# Patient Record
Sex: FEMALE | Race: ASIAN | NOT HISPANIC OR LATINO | URBAN - METROPOLITAN AREA
[De-identification: names, ages, dates, MRNs, and addresses within clinical notes are randomized per-mention and may not be internally consistent; named-entity substitution may affect disease eponyms.]

---

## 2018-05-20 ENCOUNTER — EMERGENCY (EMERGENCY)
Facility: HOSPITAL | Age: 38
LOS: 1 days | Discharge: HOME | End: 2018-05-20
Attending: EMERGENCY MEDICINE | Admitting: EMERGENCY MEDICINE
Payer: MEDICAID

## 2018-05-20 VITALS
OXYGEN SATURATION: 100 % | HEART RATE: 94 BPM | SYSTOLIC BLOOD PRESSURE: 139 MMHG | DIASTOLIC BLOOD PRESSURE: 101 MMHG | TEMPERATURE: 98 F

## 2018-05-20 PROCEDURE — 99283 EMERGENCY DEPT VISIT LOW MDM: CPT

## 2018-05-20 RX ORDER — FLUCONAZOLE 150 MG/1
150 TABLET ORAL ONCE
Qty: 0 | Refills: 0 | Status: COMPLETED | OUTPATIENT
Start: 2018-05-20 | End: 2018-05-20

## 2018-05-20 RX ORDER — LIDOCAINE 4 G/100G
5 CREAM TOPICAL
Qty: 2 | Refills: 0 | OUTPATIENT
Start: 2018-05-20

## 2018-05-20 RX ORDER — LIDOCAINE 4 G/100G
1 CREAM TOPICAL ONCE
Qty: 0 | Refills: 0 | Status: DISCONTINUED | OUTPATIENT
Start: 2018-05-20 | End: 2018-05-20

## 2018-05-20 RX ORDER — LIDOCAINE 4 G/100G
1 CREAM TOPICAL ONCE
Qty: 0 | Refills: 0 | Status: COMPLETED | OUTPATIENT
Start: 2018-05-20 | End: 2018-05-20

## 2018-05-20 RX ADMIN — FLUCONAZOLE 150 MILLIGRAM(S): 150 TABLET ORAL at 14:18

## 2018-05-20 RX ADMIN — LIDOCAINE 1 APPLICATION(S): 4 CREAM TOPICAL at 14:19

## 2018-05-20 NOTE — ED PROVIDER NOTE - MEDICAL DECISION MAKING DETAILS
38yof w/ vaginal itching and burning, scant white discharge c/w candidal vulvovaginitis likely secondary to recent antibiotic use. Will give single dose oral fluconazole, topical lidocaine. Pt has follow up arranged w/ GYN in 3 days. No concerning systemic signs or symptoms, exam not c/w PID, no urinary symptoms.

## 2018-05-20 NOTE — ED PROVIDER NOTE - ATTENDING CONTRIBUTION TO CARE
DR. PAUL, ATTENDING MD-  I performed a face to face bedside interview with patient regarding history of present illness, review of symptoms and past medical history. I completed an independent physical exam.  I have discussed patient's plan of care with the resident.   Documentation as above in the note.    39 y/o female with vaginal itching x2 wks, recent abx use for uti.  Denies f/c, ha, neck stiffness, cp, sob, cough, abd pain, n/v/d, dysuria.  Afebrile, vs wnl, nad, ctabil, s1s2 rrr no m/r/g, abd soft non ttp no r/g, gu:  (chaperone RN Vanessa) scaling rash to b/l labia and surrounding region without discharge or fluctuance.  Vulvovaginitis.  Monostat, gyn f/u.

## 2018-05-20 NOTE — ED PROVIDER NOTE - OBJECTIVE STATEMENT
38yof w/ DM2 and HLD p/w vaginal itching and burning. Setswana speaking,  [id number] used via telephone. Pt had gradual onset vaginal itching, burning and swelling two weeks ago, associated w/ dysuria and suprapubic pain. Was seen at [facility] and was treated w/ an antibiotic. Urinary symptoms and pain have resolved, but still having significant vaginal itching and pain w/ ambulation. Denies any vaginal discharge, fevers, chills, abdominal pain. No hx of pelvic infections. LMP 5/10/18.

## 2018-05-20 NOTE — ED ADULT TRIAGE NOTE - CHIEF COMPLAINT QUOTE
# [id number]  Pt arrives via EMS.  Presents for of perineal itchiness, denies discharge, and burning when urinating.  Pt evaluated for same at [facility] on 5/17/18, Dx with UTI and cystitis.  Started on Macrobid and Pyridium.

## 2018-11-28 ENCOUNTER — INPATIENT (INPATIENT)
Facility: HOSPITAL | Age: 38
LOS: 2 days | Discharge: HOME HEALTH | End: 2018-12-01
Attending: INTERNAL MEDICINE | Admitting: INTERNAL MEDICINE
Payer: COMMERCIAL

## 2018-11-28 VITALS
OXYGEN SATURATION: 99 % | RESPIRATION RATE: 18 BRPM | HEART RATE: 92 BPM | DIASTOLIC BLOOD PRESSURE: 95 MMHG | TEMPERATURE: 98 F | SYSTOLIC BLOOD PRESSURE: 136 MMHG

## 2018-11-28 DIAGNOSIS — S02.609A FRACTURE OF MANDIBLE, UNSPECIFIED, INITIAL ENCOUNTER FOR CLOSED FRACTURE: ICD-10-CM

## 2018-11-28 DIAGNOSIS — E11.9 TYPE 2 DIABETES MELLITUS WITHOUT COMPLICATIONS: ICD-10-CM

## 2018-11-28 LAB
ALBUMIN SERPL ELPH-MCNC: 4.7 G/DL — SIGNIFICANT CHANGE UP (ref 3.3–5)
ALP SERPL-CCNC: 66 U/L — SIGNIFICANT CHANGE UP (ref 40–120)
ALT FLD-CCNC: 48 U/L — HIGH (ref 4–33)
AST SERPL-CCNC: 33 U/L — HIGH (ref 4–32)
BASOPHILS # BLD AUTO: 0.04 K/UL — SIGNIFICANT CHANGE UP (ref 0–0.2)
BASOPHILS NFR BLD AUTO: 0.5 % — SIGNIFICANT CHANGE UP (ref 0–2)
BILIRUB SERPL-MCNC: 0.2 MG/DL — SIGNIFICANT CHANGE UP (ref 0.2–1.2)
BUN SERPL-MCNC: 4 MG/DL — LOW (ref 7–23)
CALCIUM SERPL-MCNC: 9.5 MG/DL — SIGNIFICANT CHANGE UP (ref 8.4–10.5)
CHLORIDE SERPL-SCNC: 103 MMOL/L — SIGNIFICANT CHANGE UP (ref 98–107)
CO2 SERPL-SCNC: 20 MMOL/L — LOW (ref 22–31)
CREAT SERPL-MCNC: 0.61 MG/DL — SIGNIFICANT CHANGE UP (ref 0.5–1.3)
EOSINOPHIL # BLD AUTO: 0.1 K/UL — SIGNIFICANT CHANGE UP (ref 0–0.5)
EOSINOPHIL NFR BLD AUTO: 1.2 % — SIGNIFICANT CHANGE UP (ref 0–6)
GLUCOSE SERPL-MCNC: 127 MG/DL — HIGH (ref 70–99)
HCT VFR BLD CALC: 33.9 % — LOW (ref 34.5–45)
HGB BLD-MCNC: 10.7 G/DL — LOW (ref 11.5–15.5)
IMM GRANULOCYTES # BLD AUTO: 0.02 # — SIGNIFICANT CHANGE UP
IMM GRANULOCYTES NFR BLD AUTO: 0.2 % — SIGNIFICANT CHANGE UP (ref 0–1.5)
LYMPHOCYTES # BLD AUTO: 2.66 K/UL — SIGNIFICANT CHANGE UP (ref 1–3.3)
LYMPHOCYTES # BLD AUTO: 31.8 % — SIGNIFICANT CHANGE UP (ref 13–44)
MCHC RBC-ENTMCNC: 25.8 PG — LOW (ref 27–34)
MCHC RBC-ENTMCNC: 31.6 % — LOW (ref 32–36)
MCV RBC AUTO: 81.7 FL — SIGNIFICANT CHANGE UP (ref 80–100)
MONOCYTES # BLD AUTO: 0.44 K/UL — SIGNIFICANT CHANGE UP (ref 0–0.9)
MONOCYTES NFR BLD AUTO: 5.3 % — SIGNIFICANT CHANGE UP (ref 2–14)
NEUTROPHILS # BLD AUTO: 5.11 K/UL — SIGNIFICANT CHANGE UP (ref 1.8–7.4)
NEUTROPHILS NFR BLD AUTO: 61 % — SIGNIFICANT CHANGE UP (ref 43–77)
NRBC # FLD: 0 — SIGNIFICANT CHANGE UP
PLATELET # BLD AUTO: 449 K/UL — HIGH (ref 150–400)
PMV BLD: 9.4 FL — SIGNIFICANT CHANGE UP (ref 7–13)
POTASSIUM SERPL-MCNC: 3.9 MMOL/L — SIGNIFICANT CHANGE UP (ref 3.5–5.3)
POTASSIUM SERPL-SCNC: 3.9 MMOL/L — SIGNIFICANT CHANGE UP (ref 3.5–5.3)
PROT SERPL-MCNC: 8.3 G/DL — SIGNIFICANT CHANGE UP (ref 6–8.3)
RBC # BLD: 4.15 M/UL — SIGNIFICANT CHANGE UP (ref 3.8–5.2)
RBC # FLD: 13.2 % — SIGNIFICANT CHANGE UP (ref 10.3–14.5)
SODIUM SERPL-SCNC: 136 MMOL/L — SIGNIFICANT CHANGE UP (ref 135–145)
WBC # BLD: 8.37 K/UL — SIGNIFICANT CHANGE UP (ref 3.8–10.5)
WBC # FLD AUTO: 8.37 K/UL — SIGNIFICANT CHANGE UP (ref 3.8–10.5)

## 2018-11-28 PROCEDURE — 70487 CT MAXILLOFACIAL W/DYE: CPT | Mod: 26

## 2018-11-28 PROCEDURE — 99223 1ST HOSP IP/OBS HIGH 75: CPT

## 2018-11-28 RX ORDER — OXYCODONE HYDROCHLORIDE 5 MG/1
5 TABLET ORAL EVERY 6 HOURS
Qty: 0 | Refills: 0 | Status: DISCONTINUED | OUTPATIENT
Start: 2018-11-28 | End: 2018-11-28

## 2018-11-28 RX ORDER — SODIUM CHLORIDE 9 MG/ML
1000 INJECTION INTRAMUSCULAR; INTRAVENOUS; SUBCUTANEOUS
Qty: 0 | Refills: 0 | Status: DISCONTINUED | OUTPATIENT
Start: 2018-11-28 | End: 2018-12-01

## 2018-11-28 RX ORDER — MORPHINE SULFATE 50 MG/1
2 CAPSULE, EXTENDED RELEASE ORAL EVERY 4 HOURS
Qty: 0 | Refills: 0 | Status: DISCONTINUED | OUTPATIENT
Start: 2018-11-28 | End: 2018-11-29

## 2018-11-28 RX ORDER — ACETAMINOPHEN 500 MG
650 TABLET ORAL EVERY 6 HOURS
Qty: 0 | Refills: 0 | Status: DISCONTINUED | OUTPATIENT
Start: 2018-11-28 | End: 2018-11-30

## 2018-11-28 RX ORDER — AMPICILLIN SODIUM AND SULBACTAM SODIUM 250; 125 MG/ML; MG/ML
3 INJECTION, POWDER, FOR SUSPENSION INTRAMUSCULAR; INTRAVENOUS EVERY 6 HOURS
Qty: 0 | Refills: 0 | Status: COMPLETED | OUTPATIENT
Start: 2018-11-28 | End: 2018-11-30

## 2018-11-28 RX ORDER — ACETAMINOPHEN 500 MG
975 TABLET ORAL ONCE
Qty: 0 | Refills: 0 | Status: COMPLETED | OUTPATIENT
Start: 2018-11-28 | End: 2018-11-28

## 2018-11-28 RX ORDER — OXYCODONE HYDROCHLORIDE 5 MG/1
5 TABLET ORAL EVERY 6 HOURS
Qty: 0 | Refills: 0 | Status: DISCONTINUED | OUTPATIENT
Start: 2018-11-28 | End: 2018-11-29

## 2018-11-28 RX ADMIN — AMPICILLIN SODIUM AND SULBACTAM SODIUM 200 GRAM(S): 250; 125 INJECTION, POWDER, FOR SUSPENSION INTRAMUSCULAR; INTRAVENOUS at 19:00

## 2018-11-28 RX ADMIN — Medication 975 MILLIGRAM(S): at 13:38

## 2018-11-28 NOTE — ED PROVIDER NOTE - ATTENDING CONTRIBUTION TO CARE
I, Ramesh Mayers MD, personally saw the patient with ACP.  I have personally performed a face to face diagnostic evaluation on this patient.  I have reviewed the ACP note and agree with the history, exam, and plan of care, except as noted.

## 2018-11-28 NOTE — CONSULT NOTE ADULT - SUBJECTIVE AND OBJECTIVE BOX
38y F presents with R facial swelling. Pt speaks Croatian and used  (#[id number]). Pt reports that tooth #32 was extracted 10 days ago at [facility] due to pain and infection. She states that remarkable pain and swelling began several days ago at which point she returned to [place]. She was prescribed Ibuprofen and Clindamycin. Patient states that pain and swelling have not subsided and is increasing. Does endorse subjective fevers and diarrhea. No cough, abd pain, or vomiting.      PMH: Diabetes, GERD,   PSH: Biopsy/exam 2 weeks ago due to anemia/menorrhagia of unknown origin  Meds: Metformin, Rinatidine, Clindamycin, Ibuprofen, Multivitamins  SH: denies  All: NKDA    Vital Signs Last 24 Hrs  T(C): 36.6 (28 Nov 2018 17:52), Max: 36.9 (28 Nov 2018 12:43)  T(F): 97.9 (28 Nov 2018 17:52), Max: 98.4 (28 Nov 2018 12:43)  HR: 93 (28 Nov 2018 17:52) (92 - 93)  BP: 129/85 (28 Nov 2018 17:52) (129/85 - 136/95)  RR: 18 (28 Nov 2018 17:52) (18 - 18)  SpO2: 100% (28 Nov 2018 17:52) (99% - 100%)    Gen: NAD, AAOx3  EOE: R facial edema, palpable bony protrusion appreciated R mandible  MICHELLE: 20mm  IOE: generalized good OH, exo socket #32 visualized, occlusion stable and reproducible, gingiva clean, no signs of infection                          10.7   8.37  )-----------( 449      ( 28 Nov 2018 13:40 )             33.9     11-28    136  |  103  |  4<L>  ----------------------------<  127<H>  3.9   |  20<L>  |  0.61    Ca    9.5      28 Nov 2018 13:40    TPro  8.3  /  Alb  4.7  /  TBili  0.2  /  DBili  x   /  AST  33<H>  /  ALT  48<H>  /  AlkPhos  66  11-28    I&O's Summary    Radiographic findings: (CT Max/Face without contrast) Recent extraction socket for the right third mandibular molar tooth   associated with a displaced fracture through the mandibular angle with overlapping of fracture fragments, widening of the fracture line and bordering soft tissue swelling worrisome for an infected fracture. No soft tissue abscess.     A/P: 38y Female w/ worsening R facial swelling and R mandible fracture 3 days s/p treatment w/ Clindamycin 10 days s/p third molar extraction.    - Recommend medicine admission due to continued swelling in the setting of tx with clindamycin as well as recent history of anemia/menorrhagia of unknown origin.   - In the setting of mandible fracture, in case condition worsens and warrants surgical intervention, will need pre-surgical labs.   - Recommend soft non-chew diet  - NPO midnight in the event that patient will need surgical intervention 38y F presents with R facial swelling. Pt speaks Irish and used  (#[id number]). Pt reports that tooth #32 was extracted 10 days ago at [facility] due to pain and infection. She states that remarkable pain and swelling began several days ago at which point she returned to [place]. She was prescribed Ibuprofen and Clindamycin. Patient states that pain and swelling have not subsided and is increasing. Does endorse subjective fevers and diarrhea. No cough, abd pain, or vomiting.      PMH: Diabetes, GERD,   PSH: Biopsy/exam 2 weeks ago due to anemia/menorrhagia of unknown origin  Meds: Metformin, Rinatidine, Clindamycin, Ibuprofen, Multivitamins  SH: denies  All: NKDA    Vital Signs Last 24 Hrs  T(C): 36.6 (28 Nov 2018 17:52), Max: 36.9 (28 Nov 2018 12:43)  T(F): 97.9 (28 Nov 2018 17:52), Max: 98.4 (28 Nov 2018 12:43)  HR: 93 (28 Nov 2018 17:52) (92 - 93)  BP: 129/85 (28 Nov 2018 17:52) (129/85 - 136/95)  RR: 18 (28 Nov 2018 17:52) (18 - 18)  SpO2: 100% (28 Nov 2018 17:52) (99% - 100%)    Gen: NAD, AAOx3  EOE: R facial edema, palpable bony protrusion appreciated R mandible  MICHELLE: 20mm  IOE: generalized good OH, exo socket #32 visualized, occlusion stable and reproducible, gingiva clean, no signs of infection                          10.7   8.37  )-----------( 449      ( 28 Nov 2018 13:40 )             33.9     11-28    136  |  103  |  4<L>  ----------------------------<  127<H>  3.9   |  20<L>  |  0.61    Ca    9.5      28 Nov 2018 13:40    TPro  8.3  /  Alb  4.7  /  TBili  0.2  /  DBili  x   /  AST  33<H>  /  ALT  48<H>  /  AlkPhos  66  11-28    I&O's Summary    Radiographic findings: (CT Max/Face without contrast) Recent extraction socket for the right third mandibular molar tooth   associated with a displaced fracture through the mandibular angle with overlapping of fracture fragments, widening of the fracture line and bordering soft tissue swelling worrisome for an infected fracture. No soft tissue abscess.     A/P: 38y Female w/ worsening R facial swelling and R mandible fracture 3 days s/p treatment w/ Clindamycin 10 days s/p third molar extraction.    - Recommend medicine admission due to continued swelling in the setting of tx with clindamycin as well as recent history of anemia/menorrhagia of unknown origin.   - In the setting of mandible fracture, in case condition worsens and warrants surgical intervention, will need pre-surgical labs.   - Recommend soft non-chew diet  - Peridex BID  - NPO midnight in the event that patient will need surgical intervention 38y F presents with R facial swelling. Pt speaks German and used  (#[id number]). Pt reports that tooth #32 was extracted 10 days ago at [facility] due to pain and infection. She states that remarkable pain and swelling began several days ago at which point she returned to [place]. She was prescribed Ibuprofen and Clindamycin. Patient states that pain and swelling have not subsided and is increasing. Does endorse subjective fevers and diarrhea. No cough, abd pain, or vomiting.      PMH: Diabetes, GERD,   PSH: Biopsy/exam 2 weeks ago due to anemia/menorrhagia of unknown origin  Meds: Metformin, Rinatidine, Clindamycin, Ibuprofen, Multivitamins  SH: denies  All: NKDA    Vital Signs Last 24 Hrs  T(C): 36.6 (28 Nov 2018 17:52), Max: 36.9 (28 Nov 2018 12:43)  T(F): 97.9 (28 Nov 2018 17:52), Max: 98.4 (28 Nov 2018 12:43)  HR: 93 (28 Nov 2018 17:52) (92 - 93)  BP: 129/85 (28 Nov 2018 17:52) (129/85 - 136/95)  RR: 18 (28 Nov 2018 17:52) (18 - 18)  SpO2: 100% (28 Nov 2018 17:52) (99% - 100%)    Gen: NAD, AAOx3  EOE: R facial edema, palpable bony protrusion appreciated R mandible  MICHELLE: 20mm  IOE: generalized good OH, exo socket #32 visualized, occlusion stable and reproducible, gingiva clean, no signs of infection                          10.7   8.37  )-----------( 449      ( 28 Nov 2018 13:40 )             33.9     11-28    136  |  103  |  4<L>  ----------------------------<  127<H>  3.9   |  20<L>  |  0.61    Ca    9.5      28 Nov 2018 13:40    TPro  8.3  /  Alb  4.7  /  TBili  0.2  /  DBili  x   /  AST  33<H>  /  ALT  48<H>  /  AlkPhos  66  11-28    I&O's Summary    Radiographic findings: (CT Max/Face without contrast) Recent extraction socket for the right third mandibular molar tooth   associated with a displaced fracture through the mandibular angle with overlapping of fracture fragments, widening of the fracture line and bordering soft tissue swelling worrisome for an infected fracture. No soft tissue abscess.     A/P: 38y Female w/ worsening R facial swelling and R mandible fracture 3 days s/p treatment w/ Clindamycin 10 days s/p third molar extraction.    - Recommend medicine admission due to continued swelling in the setting of tx with clindamycin as well as recent history of anemia/menorrhagia of unknown origin.   - In the setting of mandible fracture, in case condition worsens and warrants surgical intervention, will need pre-surgical labs.   - Recommend soft non-chew diet  - Peridex BID  - NPO midnight in the event that patient will need surgical intervention

## 2018-11-28 NOTE — ED PROVIDER NOTE - MEDICAL DECISION MAKING DETAILS
pt s/p wisdom tooth extraction now w/ subjective fever and worsening pain/swelling - CT to eval for abscess/infectious process

## 2018-11-28 NOTE — ED PROVIDER NOTE - PROGRESS NOTE DETAILS
SARAH Chavez - pt admitted to [facility] (dr Cedeno spoke with admitting physician), pt seen by [facility], they advise IV Unasyn and will f/u on pt. MAR text paged.

## 2018-11-28 NOTE — ED PROVIDER NOTE - CARE PLAN
Principal Discharge DX:	Mandibular fracture Principal Discharge DX:	Mandibular fracture  Secondary Diagnosis:	Cellulitis

## 2018-11-28 NOTE — H&P ADULT - NSHPREVIEWOFSYSTEMS_GEN_ALL_CORE
REVIEW OF SYSTEMS:    CONSTITUTIONAL: No weakness, + fevers and chills, no weight loss  EYES/ENT: + R jaw pain, No visual changes;  No dysphagia or odynophagia, no tinnitus  NECK: No pain or stiffness  RESPIRATORY: No cough, wheezing, hemoptysis; No shortness of breath  CARDIOVASCULAR: No chest pain or palpitations; No lower extremity edema  GASTROINTESTINAL: No abdominal or epigastric pain. No nausea, vomiting, or hematemesis; No diarrhea or constipation. No melena or hematochezia.  MUSCULOSKELETAL: No joint pain, swelling, erythema or warmth, no back pain  GENITOURINARY: No dysuria, frequency or hematuria, no suprapubic pain  NEUROLOGICAL: No numbness or weakness, no headache, no syncope, no gait abnormalities   SKIN: No itching, burning, rashes, or lesions   All other review of systems is negative unless indicated above.

## 2018-11-28 NOTE — ED PROVIDER NOTE - OBJECTIVE STATEMENT
38y F presents to the ED with dental pain x1 week. Pt speaks Japanese and used  ([id number]). Pt states she went to dentist last week and had 1 wisdom tooth extraction. Since then having increasing pain. Does endorse subjective fevers and diarrhea. No cough, abd pain, or vomiting. States she cannot open mouth properly 38y F presents to the ED with dental pain x1 week. Pt speaks Yoruba and used  ([id number]). Pt states she went to dentist last week and had 1 wisdom tooth extraction R mandibular. Since then having increasing pain. Does endorse subjective fevers and diarrhea. No cough, abd pain, or vomiting. States she cannot open mouth properly.  Evaluated by oral surgeon, given ibuprofen w/ limited relief.

## 2018-11-28 NOTE — ED PROVIDER NOTE - NS_ ATTENDINGSCRIBEDETAILS _ED_A_ED_FT
The scribe's documentation has been prepared under my direction and personally reviewed by me in its entirety. I confirm that the note above accurately reflects all work, treatment, procedures, and medical decision-making performed by me.  Ramesh Mayers MD

## 2018-11-28 NOTE — H&P ADULT - FAMILY HISTORY
Father  Still living? Unknown  Family history of diabetes mellitus, Age at diagnosis: Age Unknown  Family history of hypertension, Age at diagnosis: Age Unknown

## 2018-11-28 NOTE — H&P ADULT - PROBLEM SELECTOR PLAN 1
- Pt reported frequent fevers at home- will continue unasyn for now  - Will follow up with [facility] regarding need for surgery  - IV morphine prn for pain control

## 2018-11-28 NOTE — H&P ADULT - NSHPPHYSICALEXAM_GEN_ALL_CORE
T(C): 36.6 (11-28-18 @ 17:52), Max: 36.9 (11-28-18 @ 12:43)  HR: 93 (11-28-18 @ 17:52) (92 - 93)  BP: 129/85 (11-28-18 @ 17:52) (129/85 - 136/95)  RR: 18 (11-28-18 @ 17:52) (18 - 18)  SpO2: 100% (11-28-18 @ 17:52) (99% - 100%)    GENERAL: No acute distress, well-developed  HEAD:  Atraumatic, Normocephalic  ENT: EOMI, PERRLA, conjunctiva and sclera clear, Neck supple, No JVD, moist mucosa, no pharyngeal erythema, no tonsillar enlargement or exudate  CHEST/LUNG: Clear to auscultation bilaterally; No wheeze, equal breath sounds bilaterally   HEART: Regular rate and rhythm; No murmurs, rubs, or gallops  ABDOMEN: Soft, Nontender, Nondistended; Bowel sounds present, no organomegaly  EXTREMITIES:  2+ Peripheral Pulses, No clubbing, cyanosis, or edema  PSYCH: AAOx3, normal affect, normal behavior  NEUROLOGY: non-focal, cranial nerves intact, 5/5 strength throughout, normal sensation   SKIN: Normal color, No rashes or lesions

## 2018-11-28 NOTE — H&P ADULT - NSHPLABSRESULTS_GEN_ALL_CORE
.  LABS:                         10.7   8.37  )-----------( 449      ( 28 Nov 2018 13:40 )             33.9     11-28    136  |  103  |  4<L>  ----------------------------<  127<H>  3.9   |  20<L>  |  0.61    Ca    9.5      28 Nov 2018 13:40    TPro  8.3  /  Alb  4.7  /  TBili  0.2  /  DBili  x   /  AST  33<H>  /  ALT  48<H>  /  AlkPhos  66  11-28        RADIOLOGY, EKG & ADDITIONAL TESTS: Reviewed.

## 2018-11-29 DIAGNOSIS — R55 SYNCOPE AND COLLAPSE: ICD-10-CM

## 2018-11-29 DIAGNOSIS — R50.9 FEVER, UNSPECIFIED: ICD-10-CM

## 2018-11-29 LAB
ALBUMIN SERPL ELPH-MCNC: 3.9 G/DL — SIGNIFICANT CHANGE UP (ref 3.3–5)
ALP SERPL-CCNC: 55 U/L — SIGNIFICANT CHANGE UP (ref 40–120)
ALT FLD-CCNC: 39 U/L — HIGH (ref 4–33)
AST SERPL-CCNC: 23 U/L — SIGNIFICANT CHANGE UP (ref 4–32)
BASOPHILS # BLD AUTO: 0.03 K/UL — SIGNIFICANT CHANGE UP (ref 0–0.2)
BASOPHILS NFR BLD AUTO: 0.5 % — SIGNIFICANT CHANGE UP (ref 0–2)
BILIRUB SERPL-MCNC: 0.3 MG/DL — SIGNIFICANT CHANGE UP (ref 0.2–1.2)
BUN SERPL-MCNC: 3 MG/DL — LOW (ref 7–23)
CALCIUM SERPL-MCNC: 8.6 MG/DL — SIGNIFICANT CHANGE UP (ref 8.4–10.5)
CHLORIDE SERPL-SCNC: 102 MMOL/L — SIGNIFICANT CHANGE UP (ref 98–107)
CO2 SERPL-SCNC: 23 MMOL/L — SIGNIFICANT CHANGE UP (ref 22–31)
CREAT SERPL-MCNC: 0.57 MG/DL — SIGNIFICANT CHANGE UP (ref 0.5–1.3)
EOSINOPHIL # BLD AUTO: 0.11 K/UL — SIGNIFICANT CHANGE UP (ref 0–0.5)
EOSINOPHIL NFR BLD AUTO: 1.8 % — SIGNIFICANT CHANGE UP (ref 0–6)
GLUCOSE SERPL-MCNC: 113 MG/DL — HIGH (ref 70–99)
HCT VFR BLD CALC: 31.5 % — LOW (ref 34.5–45)
HGB BLD-MCNC: 10.1 G/DL — LOW (ref 11.5–15.5)
IMM GRANULOCYTES # BLD AUTO: 0.01 # — SIGNIFICANT CHANGE UP
IMM GRANULOCYTES NFR BLD AUTO: 0.2 % — SIGNIFICANT CHANGE UP (ref 0–1.5)
LYMPHOCYTES # BLD AUTO: 2.42 K/UL — SIGNIFICANT CHANGE UP (ref 1–3.3)
LYMPHOCYTES # BLD AUTO: 40.6 % — SIGNIFICANT CHANGE UP (ref 13–44)
MAGNESIUM SERPL-MCNC: 1.9 MG/DL — SIGNIFICANT CHANGE UP (ref 1.6–2.6)
MCHC RBC-ENTMCNC: 26.1 PG — LOW (ref 27–34)
MCHC RBC-ENTMCNC: 32.1 % — SIGNIFICANT CHANGE UP (ref 32–36)
MCV RBC AUTO: 81.4 FL — SIGNIFICANT CHANGE UP (ref 80–100)
MONOCYTES # BLD AUTO: 0.35 K/UL — SIGNIFICANT CHANGE UP (ref 0–0.9)
MONOCYTES NFR BLD AUTO: 5.9 % — SIGNIFICANT CHANGE UP (ref 2–14)
NEUTROPHILS # BLD AUTO: 3.04 K/UL — SIGNIFICANT CHANGE UP (ref 1.8–7.4)
NEUTROPHILS NFR BLD AUTO: 51 % — SIGNIFICANT CHANGE UP (ref 43–77)
NRBC # FLD: 0 — SIGNIFICANT CHANGE UP
PHOSPHATE SERPL-MCNC: 3.9 MG/DL — SIGNIFICANT CHANGE UP (ref 2.5–4.5)
PLATELET # BLD AUTO: 396 K/UL — SIGNIFICANT CHANGE UP (ref 150–400)
PMV BLD: 9.5 FL — SIGNIFICANT CHANGE UP (ref 7–13)
POTASSIUM SERPL-MCNC: 3.5 MMOL/L — SIGNIFICANT CHANGE UP (ref 3.5–5.3)
POTASSIUM SERPL-SCNC: 3.5 MMOL/L — SIGNIFICANT CHANGE UP (ref 3.5–5.3)
PROT SERPL-MCNC: 7 G/DL — SIGNIFICANT CHANGE UP (ref 6–8.3)
RBC # BLD: 3.87 M/UL — SIGNIFICANT CHANGE UP (ref 3.8–5.2)
RBC # FLD: 13.2 % — SIGNIFICANT CHANGE UP (ref 10.3–14.5)
SODIUM SERPL-SCNC: 137 MMOL/L — SIGNIFICANT CHANGE UP (ref 135–145)
WBC # BLD: 5.96 K/UL — SIGNIFICANT CHANGE UP (ref 3.8–10.5)
WBC # FLD AUTO: 5.96 K/UL — SIGNIFICANT CHANGE UP (ref 3.8–10.5)

## 2018-11-29 PROCEDURE — 70450 CT HEAD/BRAIN W/O DYE: CPT | Mod: 26

## 2018-11-29 PROCEDURE — 99233 SBSQ HOSP IP/OBS HIGH 50: CPT

## 2018-11-29 RX ORDER — DEXTROSE 50 % IN WATER 50 %
25 SYRINGE (ML) INTRAVENOUS ONCE
Qty: 0 | Refills: 0 | Status: DISCONTINUED | OUTPATIENT
Start: 2018-11-29 | End: 2018-12-01

## 2018-11-29 RX ORDER — SODIUM CHLORIDE 9 MG/ML
1000 INJECTION, SOLUTION INTRAVENOUS
Qty: 0 | Refills: 0 | Status: DISCONTINUED | OUTPATIENT
Start: 2018-11-29 | End: 2018-12-01

## 2018-11-29 RX ORDER — ONDANSETRON 8 MG/1
4 TABLET, FILM COATED ORAL EVERY 8 HOURS
Qty: 0 | Refills: 0 | Status: DISCONTINUED | OUTPATIENT
Start: 2018-11-29 | End: 2018-12-01

## 2018-11-29 RX ORDER — CHLORHEXIDINE GLUCONATE 213 G/1000ML
15 SOLUTION TOPICAL
Qty: 0 | Refills: 0 | Status: DISCONTINUED | OUTPATIENT
Start: 2018-11-29 | End: 2018-12-01

## 2018-11-29 RX ORDER — INSULIN LISPRO 100/ML
VIAL (ML) SUBCUTANEOUS
Qty: 0 | Refills: 0 | Status: DISCONTINUED | OUTPATIENT
Start: 2018-11-29 | End: 2018-12-01

## 2018-11-29 RX ORDER — DEXTROSE 50 % IN WATER 50 %
15 SYRINGE (ML) INTRAVENOUS ONCE
Qty: 0 | Refills: 0 | Status: DISCONTINUED | OUTPATIENT
Start: 2018-11-29 | End: 2018-12-01

## 2018-11-29 RX ORDER — PANTOPRAZOLE SODIUM 20 MG/1
40 TABLET, DELAYED RELEASE ORAL DAILY
Qty: 0 | Refills: 0 | Status: DISCONTINUED | OUTPATIENT
Start: 2018-11-29 | End: 2018-12-01

## 2018-11-29 RX ORDER — INFLUENZA VIRUS VACCINE 15; 15; 15; 15 UG/.5ML; UG/.5ML; UG/.5ML; UG/.5ML
0.5 SUSPENSION INTRAMUSCULAR ONCE
Qty: 0 | Refills: 0 | Status: DISCONTINUED | OUTPATIENT
Start: 2018-11-29 | End: 2018-12-01

## 2018-11-29 RX ORDER — OXYCODONE HYDROCHLORIDE 5 MG/1
5 TABLET ORAL EVERY 6 HOURS
Qty: 0 | Refills: 0 | Status: DISCONTINUED | OUTPATIENT
Start: 2018-11-29 | End: 2018-11-30

## 2018-11-29 RX ORDER — GLUCAGON INJECTION, SOLUTION 0.5 MG/.1ML
1 INJECTION, SOLUTION SUBCUTANEOUS ONCE
Qty: 0 | Refills: 0 | Status: DISCONTINUED | OUTPATIENT
Start: 2018-11-29 | End: 2018-12-01

## 2018-11-29 RX ORDER — DEXTROSE 50 % IN WATER 50 %
12.5 SYRINGE (ML) INTRAVENOUS ONCE
Qty: 0 | Refills: 0 | Status: DISCONTINUED | OUTPATIENT
Start: 2018-11-29 | End: 2018-12-01

## 2018-11-29 RX ORDER — INSULIN LISPRO 100/ML
VIAL (ML) SUBCUTANEOUS AT BEDTIME
Qty: 0 | Refills: 0 | Status: DISCONTINUED | OUTPATIENT
Start: 2018-11-29 | End: 2018-12-01

## 2018-11-29 RX ORDER — MORPHINE SULFATE 50 MG/1
2 CAPSULE, EXTENDED RELEASE ORAL EVERY 4 HOURS
Qty: 0 | Refills: 0 | Status: DISCONTINUED | OUTPATIENT
Start: 2018-11-29 | End: 2018-11-30

## 2018-11-29 RX ADMIN — AMPICILLIN SODIUM AND SULBACTAM SODIUM 200 GRAM(S): 250; 125 INJECTION, POWDER, FOR SUSPENSION INTRAMUSCULAR; INTRAVENOUS at 00:42

## 2018-11-29 RX ADMIN — AMPICILLIN SODIUM AND SULBACTAM SODIUM 200 GRAM(S): 250; 125 INJECTION, POWDER, FOR SUSPENSION INTRAMUSCULAR; INTRAVENOUS at 23:49

## 2018-11-29 RX ADMIN — SODIUM CHLORIDE 100 MILLILITER(S): 9 INJECTION INTRAMUSCULAR; INTRAVENOUS; SUBCUTANEOUS at 05:46

## 2018-11-29 RX ADMIN — AMPICILLIN SODIUM AND SULBACTAM SODIUM 200 GRAM(S): 250; 125 INJECTION, POWDER, FOR SUSPENSION INTRAMUSCULAR; INTRAVENOUS at 17:25

## 2018-11-29 RX ADMIN — PANTOPRAZOLE SODIUM 40 MILLIGRAM(S): 20 TABLET, DELAYED RELEASE ORAL at 11:37

## 2018-11-29 RX ADMIN — MORPHINE SULFATE 2 MILLIGRAM(S): 50 CAPSULE, EXTENDED RELEASE ORAL at 05:51

## 2018-11-29 RX ADMIN — CHLORHEXIDINE GLUCONATE 15 MILLILITER(S): 213 SOLUTION TOPICAL at 17:25

## 2018-11-29 RX ADMIN — MORPHINE SULFATE 2 MILLIGRAM(S): 50 CAPSULE, EXTENDED RELEASE ORAL at 06:05

## 2018-11-29 RX ADMIN — MORPHINE SULFATE 2 MILLIGRAM(S): 50 CAPSULE, EXTENDED RELEASE ORAL at 00:41

## 2018-11-29 RX ADMIN — MORPHINE SULFATE 2 MILLIGRAM(S): 50 CAPSULE, EXTENDED RELEASE ORAL at 01:00

## 2018-11-29 RX ADMIN — AMPICILLIN SODIUM AND SULBACTAM SODIUM 200 GRAM(S): 250; 125 INJECTION, POWDER, FOR SUSPENSION INTRAMUSCULAR; INTRAVENOUS at 05:47

## 2018-11-29 RX ADMIN — MORPHINE SULFATE 2 MILLIGRAM(S): 50 CAPSULE, EXTENDED RELEASE ORAL at 21:54

## 2018-11-29 RX ADMIN — MORPHINE SULFATE 2 MILLIGRAM(S): 50 CAPSULE, EXTENDED RELEASE ORAL at 10:11

## 2018-11-29 RX ADMIN — MORPHINE SULFATE 2 MILLIGRAM(S): 50 CAPSULE, EXTENDED RELEASE ORAL at 09:10

## 2018-11-29 RX ADMIN — SODIUM CHLORIDE 100 MILLILITER(S): 9 INJECTION INTRAMUSCULAR; INTRAVENOUS; SUBCUTANEOUS at 00:55

## 2018-11-29 RX ADMIN — AMPICILLIN SODIUM AND SULBACTAM SODIUM 200 GRAM(S): 250; 125 INJECTION, POWDER, FOR SUSPENSION INTRAMUSCULAR; INTRAVENOUS at 11:37

## 2018-11-29 NOTE — PROGRESS NOTE ADULT - PROBLEM SELECTOR PLAN 3
o/p fever.  reports occasional dysuria but no other symptoms.  no fevers here.  will continue on unasyn for ? dental infection.  check u/a.

## 2018-11-29 NOTE — PROGRESS NOTE ADULT - PROBLEM SELECTOR PLAN 1
s/p tooth extraction, ? fracture from fall.  OMFS to intervene once sycnope/fall/trauma w/u complete s/p tooth extraction, ? fracture from fall.  OMFS to intervene once sycnope/fall/trauma w/u complete.  pain control, requiring IV opioids

## 2018-11-29 NOTE — PROGRESS NOTE ADULT - SUBJECTIVE AND OBJECTIVE BOX
38 y.o female w/ R mandibular angle fracture. Patient appears stable while laying in bed. Denies fevers, chills, nausea, vomiting.     HPI: Pt reports that tooth #32 was extracted 10 days ago at [facility] due to pain and infection. She states that remarkable pain and swelling began several days ago at which point she returned to [place]. She was prescribed Ibuprofen and Clindamycin. Patient states that pain and swelling have not subsided and is increasing. Does endorse subjective fevers and diarrhea. No cough, abd pain, or vomiting.      PMH: Diabetes, GERD,   PSH: Biopsy/exam 2 weeks ago due to anemia/menorrhagia of unknown origin  Meds: Metformin, Rinatidine, Clindamycin, Ibuprofen, Multivitamins  SH: denies  All: NKDA    Vital Signs Last 24 Hrs  T(C): 36.9 (29 Nov 2018 08:39), Max: 37 (29 Nov 2018 05:44)  T(F): 98.4 (29 Nov 2018 08:39), Max: 98.6 (29 Nov 2018 05:44)  HR: 88 (29 Nov 2018 08:39) (81 - 100)  BP: 121/64 (29 Nov 2018 08:39) (113/78 - 136/95)  BP(mean): --  RR: 17 (29 Nov 2018 08:39) (17 - 18)  SpO2: 99% (29 Nov 2018 08:39) (99% - 100%)    Gen: NAD, AAOx3  EOE: R facial edema, palpable bony protrusion appreciated R mandible  MICHELLE: 20mm  IOE: generalized good OH, exo socket #32 visualized, occlusion stable and reproducible, gingiva clean, no signs of infection    Radiographic findings: (CT Max/Face without contrast) Recent extraction socket for the right third mandibular molar tooth   associated with a displaced fracture through the mandibular angle with overlapping of fracture fragments, widening of the fracture line and bordering soft tissue swelling worrisome for an infected fracture. No soft tissue abscess.     A/P: 38y Female w/ worsening R facial swelling and R mandible fracture 3 days s/p treatment w/ Clindamycin 10 days s/p third molar extraction. After discussion with patient via telephone , discussed treatment options for mandible fracture including open vs closed reduction or no treatment. Patient has decided that she will forego the recommend treatment of open reduction, internal fixation due to low, yet possible risk of paresthesia. Patient has decided to continue with closed reduction, which includes being wired shut for 4-6+ weeks. Procedure will be done in the OR w/ Dr. Remy.   - Closed reduction of R mandible fracture with Dr. Remy in the OR today.  - NPO until OR  - Post op recommendations: Peridex BID, full liquid diet, Amoxicillin, pain control   - Please page OMFS [id number] with any questions or concerns 38 y.o female w/ R mandibular angle fracture. Patient appears stable while laying in bed. Denies fevers, chills, nausea, vomiting.     HPI: Pt reports that tooth #32 was extracted 10 days ago at [facility] due to pain and infection. She states that remarkable pain and swelling began several days ago at which point she returned to [place]. She was prescribed Ibuprofen and Clindamycin. Patient states that pain and swelling have not subsided and is increasing. Does endorse subjective fevers and diarrhea. No cough, abd pain, or vomiting.      PMH: Diabetes, GERD,   PSH: Biopsy/exam 2 weeks ago due to anemia/menorrhagia of unknown origin  Meds: Metformin, Rinatidine, Clindamycin, Ibuprofen, Multivitamins  SH: denies  All: NKDA    Vital Signs Last 24 Hrs  T(C): 36.9 (29 Nov 2018 08:39), Max: 37 (29 Nov 2018 05:44)  T(F): 98.4 (29 Nov 2018 08:39), Max: 98.6 (29 Nov 2018 05:44)  HR: 88 (29 Nov 2018 08:39) (81 - 100)  BP: 121/64 (29 Nov 2018 08:39) (113/78 - 136/95)  BP(mean): --  RR: 17 (29 Nov 2018 08:39) (17 - 18)  SpO2: 99% (29 Nov 2018 08:39) (99% - 100%)    Gen: NAD, AAOx3  EOE: R facial edema, palpable bony protrusion appreciated R mandible  MICHELLE: 20mm  IOE: generalized good OH, exo socket #32 visualized, occlusion stable and reproducible, gingiva clean, no signs of infection    Radiographic findings: (CT Max/Face without contrast) Recent extraction socket for the right third mandibular molar tooth   associated with a displaced fracture through the mandibular angle with overlapping of fracture fragments, widening of the fracture line and bordering soft tissue swelling worrisome for an infected fracture. No soft tissue abscess.     A/P: 38y Female w/ worsening R facial swelling and R mandible fracture 3 days s/p treatment w/ Clindamycin 10 days s/p third molar extraction. After discussion with patient via telephone , discussed treatment options for mandible fracture including open vs closed reduction or no treatment. Patient has decided that she will forego the recommend treatment of open reduction, internal fixation due to low, yet possible risk of paresthesia. Patient has decided to continue with closed reduction, which includes being wired shut for 4-6+ weeks. Procedure will be done chairside under local anesthesia.   - Closed reduction of R mandible fracture chairside.   - Post op recommendations: Peridex BID, full liquid diet, pain control   - Please page OMFS [id number] with any questions or concerns 38 y.o female w/ R mandibular angle fracture. Patient appears stable while laying in bed. Denies fevers, chills, nausea, vomiting.     HPI: Pt reports that tooth #32 was extracted 10 days ago at [facility] due to pain and infection. She states that remarkable pain and swelling began several days ago at which point she returned to [place]. She was prescribed Ibuprofen and Clindamycin. Patient states that pain and swelling have not subsided and is increasing. Does endorse subjective fevers and diarrhea. No cough, abd pain, or vomiting.      PMH: Diabetes, GERD,   PSH: Biopsy/exam 2 weeks ago due to anemia/menorrhagia of unknown origin  Meds: Metformin, Rinatidine, Clindamycin, Ibuprofen, Multivitamins  SH: denies  All: NKDA    Vital Signs Last 24 Hrs  T(C): 36.9 (29 Nov 2018 08:39), Max: 37 (29 Nov 2018 05:44)  T(F): 98.4 (29 Nov 2018 08:39), Max: 98.6 (29 Nov 2018 05:44)  HR: 88 (29 Nov 2018 08:39) (81 - 100)  BP: 121/64 (29 Nov 2018 08:39) (113/78 - 136/95)  BP(mean): --  RR: 17 (29 Nov 2018 08:39) (17 - 18)  SpO2: 99% (29 Nov 2018 08:39) (99% - 100%)    Gen: NAD, AAOx3  EOE: R facial edema, palpable bony protrusion appreciated R mandible  MICHELLE: 20mm  IOE: generalized good OH, exo socket #32 visualized, occlusion stable and reproducible, gingiva clean, no signs of infection    Radiographic findings: (CT Max/Face without contrast) Recent extraction socket for the right third mandibular molar tooth   associated with a displaced fracture through the mandibular angle with overlapping of fracture fragments, widening of the fracture line and bordering soft tissue swelling worrisome for an infected fracture. No soft tissue abscess.     A/P: 38y Female w/ worsening R facial swelling and R mandible fracture 3 days s/p treatment w/ Clindamycin 10 days s/p third molar extraction. After discussion with patient via telephone , discussed treatment options for mandible fracture including open vs closed reduction or no treatment. Patient has decided that she will forego the recommend treatment of open reduction, internal fixation due to low, yet possible risk of paresthesia. Patient has decided to continue with closed reduction, which includes being wired shut for 4-6+ weeks. Procedure will be done chairside under local anesthesia.   - Closed reduction of R mandible fracture chairside pending medicine clearance.   - Post op recommendations: Peridex BID, full liquid diet, pain control   - Please page OMFS [id number] with any questions or concerns 38 y.o female w/ R mandibular angle fracture. Patient appears stable while laying in bed. Denies fevers, chills, nausea, vomiting.     HPI: Pt reports that tooth #32 was extracted 10 days ago at [facility] due to pain and infection. She states that remarkable pain and swelling began several days ago at which point she returned to [place]. She was prescribed Ibuprofen and Clindamycin. Patient states that pain and swelling have not subsided and is increasing. Does endorse subjective fevers and diarrhea. No cough, abd pain, or vomiting.      PMH: Diabetes, GERD,   PSH: Biopsy/exam 2 weeks ago due to anemia/menorrhagia of unknown origin  Meds: Metformin, Rinatidine, Clindamycin, Ibuprofen, Multivitamins  SH: denies  All: NKDA    Vital Signs Last 24 Hrs  T(C): 36.9 (29 Nov 2018 08:39), Max: 37 (29 Nov 2018 05:44)  T(F): 98.4 (29 Nov 2018 08:39), Max: 98.6 (29 Nov 2018 05:44)  HR: 88 (29 Nov 2018 08:39) (81 - 100)  BP: 121/64 (29 Nov 2018 08:39) (113/78 - 136/95)  BP(mean): --  RR: 17 (29 Nov 2018 08:39) (17 - 18)  SpO2: 99% (29 Nov 2018 08:39) (99% - 100%)    Gen: NAD, AAOx3  EOE: R facial edema, palpable bony protrusion appreciated R mandible  MICHELLE: 20mm  IOE: generalized good OH, exo socket #32 visualized, occlusion stable and reproducible, gingiva clean, no signs of infection    Radiographic findings: (CT Max/Face without contrast) Recent extraction socket for the right third mandibular molar tooth   associated with a displaced fracture through the mandibular angle with overlapping of fracture fragments, widening of the fracture line and bordering soft tissue swelling worrisome for an infected fracture. No soft tissue abscess.     A/P: 38y Female w/ worsening R facial swelling and R mandible fracture 3 days s/p treatment w/ Clindamycin 10 days s/p third molar extraction. After discussion with patient via telephone , discussed treatment options for mandible fracture including open vs closed reduction or no treatment. Patient has decided that she will forego the recommend treatment of open reduction, internal fixation due to low, yet possible risk of paresthesia. Patient has decided to continue with closed reduction, which includes being wired shut for 4-6+ weeks. Procedure will be done chairside under local anesthesia.   - Closed reduction of R mandible fracture chairside pending medicine clearance.   - Recommend Soft non-chew diet.   - Peridex BID  - Please page OMFS [id number] with any questions or concerns

## 2018-11-29 NOTE — PROGRESS NOTE ADULT - PROBLEM SELECTOR PLAN 2
likely in setting of anesthesia at OSH.  has had some lightheadedness in the past during heavy menses, but never fall in the past.  will check EKG, but low liklihood this is cardiac.  more likely vasovagal.  Non-focal neuro exam, but will check CT head given fall with LOC.

## 2018-11-29 NOTE — PROGRESS NOTE ADULT - SUBJECTIVE AND OBJECTIVE BOX
Chief Complaint: Patient is a 38y old  Female who presents with a chief complaint of Jaw pain (29 Nov 2018 11:34)      INTERVAL HPI/OVERNIGHT EVENTS:  used: [id number].  reports improved pain.  + fever, jaw pain.  occassional dysuria.  denies cough, SOB, sick contacts, headache.  Per patient had a fall and loss of consciousness after tooth extraction at outside hospital.  does not recall fall but states she was still woozy from anesthesia when she was told to get up. she reports sweating and nausea/vomiting.        MEDICATIONS  (STANDING):  ampicillin/sulbactam  IVPB 3 Gram(s) IV Intermittent every 6 hours  chlorhexidine 0.12% Liquid 15 milliLiter(s) Swish and Spit two times a day  dextrose 5%. 1000 milliLiter(s) (50 mL/Hr) IV Continuous <Continuous>  dextrose 50% Injectable 12.5 Gram(s) IV Push once  dextrose 50% Injectable 25 Gram(s) IV Push once  dextrose 50% Injectable 25 Gram(s) IV Push once  influenza   Vaccine 0.5 milliLiter(s) IntraMuscular once  insulin lispro (HumaLOG) corrective regimen sliding scale   SubCutaneous three times a day before meals  insulin lispro (HumaLOG) corrective regimen sliding scale   SubCutaneous at bedtime  pantoprazole  Injectable 40 milliGRAM(s) IV Push daily  sodium chloride 0.9%. 1000 milliLiter(s) (100 mL/Hr) IV Continuous <Continuous>    MEDICATIONS  (PRN):  acetaminophen   Tablet .. 650 milliGRAM(s) Oral every 6 hours PRN Temp greater or equal to 38C (100.4F)  dextrose 40% Gel 15 Gram(s) Oral once PRN Blood Glucose LESS THAN 70 milliGRAM(s)/deciliter  glucagon  Injectable 1 milliGRAM(s) IntraMuscular once PRN Glucose LESS THAN 70 milligrams/deciliter  morphine  - Injectable 2 milliGRAM(s) IV Push every 4 hours PRN Severe Pain (7 - 10)  oxyCODONE    IR 5 milliGRAM(s) Oral every 6 hours PRN Moderate Pain (4 - 6)      Vital Signs Last 24 Hrs  T(C): 36.9 (29 Nov 2018 08:39), Max: 37 (29 Nov 2018 05:44)  T(F): 98.4 (29 Nov 2018 08:39), Max: 98.6 (29 Nov 2018 05:44)  HR: 88 (29 Nov 2018 08:39) (81 - 100)  BP: 121/64 (29 Nov 2018 08:39) (113/78 - 136/95)  BP(mean): --  RR: 17 (29 Nov 2018 08:39) (17 - 18)  SpO2: 99% (29 Nov 2018 08:39) (99% - 100%)      I&O's Detail    28 Nov 2018 07:01  -  29 Nov 2018 07:00  --------------------------------------------------------  IN:    IV PiggyBack: 100 mL    sodium chloride 0.9%.: 600 mL  Total IN: 700 mL    OUT:  Total OUT: 0 mL    Total NET: 700 mL        CAPILLARY BLOOD GLUCOSE      POCT Blood Glucose.: 121 mg/dL (29 Nov 2018 07:32)      PHYSICAL EXAM:    GENERAL: NAD  Pulm: CTA b/l  Mouth: dry, difficultly opening.  CV: S1&S2+, rrr  ABDOMEN: bs+, soft, nt, nd,   EXTREMITIES:  2+ peripheral pulses, no appreciable edema in b/l LE  Neuro: Awake, alert.        LABS:                        10.1   5.96  )-----------( 396      ( 29 Nov 2018 05:57 )             31.5     11-29    137  |  102  |  3<L>  ----------------------------<  113<H>  3.5   |  23  |  0.57    Ca    8.6      29 Nov 2018 05:57  Phos  3.9     11-29  Mg     1.9     11-29    TPro  7.0  /  Alb  3.9  /  TBili  0.3  /  DBili  x   /  AST  23  /  ALT  39<H>  /  AlkPhos  55  11-29    LIVER FUNCTIONS - ( 29 Nov 2018 05:57 )  Alb: 3.9 g/dL / Pro: 7.0 g/dL / ALK PHOS: 55 u/L / ALT: 39 u/L / AST: 23 u/L / GGT: x     / T. Bili 0.3 mg/dL / D. Bili x         CT: Mandibular fracture    Care Discussed with Consultants/Other Providers : OMFS: workup of ?syncope prior to intervention

## 2018-11-29 NOTE — PROGRESS NOTE ADULT - ASSESSMENT
38 F, DM, recent surgery for impacted molar and post-op syncope/fall, found to have mandibular fracture and o/p fever, concern for post-op infection

## 2018-11-30 ENCOUNTER — TRANSCRIPTION ENCOUNTER (OUTPATIENT)
Age: 38
End: 2018-11-30

## 2018-11-30 LAB
APPEARANCE UR: CLEAR — SIGNIFICANT CHANGE UP
BILIRUB UR-MCNC: NEGATIVE — SIGNIFICANT CHANGE UP
BLOOD UR QL VISUAL: NEGATIVE — SIGNIFICANT CHANGE UP
BUN SERPL-MCNC: 8 MG/DL — SIGNIFICANT CHANGE UP (ref 7–23)
CALCIUM SERPL-MCNC: 8.9 MG/DL — SIGNIFICANT CHANGE UP (ref 8.4–10.5)
CHLORIDE SERPL-SCNC: 100 MMOL/L — SIGNIFICANT CHANGE UP (ref 98–107)
CO2 SERPL-SCNC: 22 MMOL/L — SIGNIFICANT CHANGE UP (ref 22–31)
COLOR SPEC: SIGNIFICANT CHANGE UP
CREAT SERPL-MCNC: 0.59 MG/DL — SIGNIFICANT CHANGE UP (ref 0.5–1.3)
GLUCOSE SERPL-MCNC: 105 MG/DL — HIGH (ref 70–99)
GLUCOSE UR-MCNC: 70 — SIGNIFICANT CHANGE UP
HBA1C BLD-MCNC: 6.9 % — HIGH (ref 4–5.6)
HCG UR-SCNC: NEGATIVE — SIGNIFICANT CHANGE UP
HCT VFR BLD CALC: 32.2 % — LOW (ref 34.5–45)
HGB BLD-MCNC: 10.4 G/DL — LOW (ref 11.5–15.5)
KETONES UR-MCNC: SIGNIFICANT CHANGE UP
LEUKOCYTE ESTERASE UR-ACNC: NEGATIVE — SIGNIFICANT CHANGE UP
MCHC RBC-ENTMCNC: 25.9 PG — LOW (ref 27–34)
MCHC RBC-ENTMCNC: 32.3 % — SIGNIFICANT CHANGE UP (ref 32–36)
MCV RBC AUTO: 80.3 FL — SIGNIFICANT CHANGE UP (ref 80–100)
NITRITE UR-MCNC: NEGATIVE — SIGNIFICANT CHANGE UP
NRBC # FLD: 0 — SIGNIFICANT CHANGE UP
PH UR: 7 — SIGNIFICANT CHANGE UP (ref 5–8)
PLATELET # BLD AUTO: 396 K/UL — SIGNIFICANT CHANGE UP (ref 150–400)
PMV BLD: 9.6 FL — SIGNIFICANT CHANGE UP (ref 7–13)
POTASSIUM SERPL-MCNC: 3.3 MMOL/L — LOW (ref 3.5–5.3)
POTASSIUM SERPL-SCNC: 3.3 MMOL/L — LOW (ref 3.5–5.3)
PROT UR-MCNC: NEGATIVE — SIGNIFICANT CHANGE UP
RBC # BLD: 4.01 M/UL — SIGNIFICANT CHANGE UP (ref 3.8–5.2)
RBC # FLD: 13.1 % — SIGNIFICANT CHANGE UP (ref 10.3–14.5)
SODIUM SERPL-SCNC: 137 MMOL/L — SIGNIFICANT CHANGE UP (ref 135–145)
SP GR SPEC: 1.02 — SIGNIFICANT CHANGE UP (ref 1–1.04)
SP GR UR: 1.01 — SIGNIFICANT CHANGE UP (ref 1–1.03)
UROBILINOGEN FLD QL: NORMAL — SIGNIFICANT CHANGE UP
WBC # BLD: 4.97 K/UL — SIGNIFICANT CHANGE UP (ref 3.8–10.5)
WBC # FLD AUTO: 4.97 K/UL — SIGNIFICANT CHANGE UP (ref 3.8–10.5)

## 2018-11-30 PROCEDURE — 99233 SBSQ HOSP IP/OBS HIGH 50: CPT

## 2018-11-30 RX ORDER — OXYCODONE HYDROCHLORIDE 5 MG/1
5 TABLET ORAL EVERY 6 HOURS
Qty: 0 | Refills: 0 | Status: DISCONTINUED | OUTPATIENT
Start: 2018-11-30 | End: 2018-11-30

## 2018-11-30 RX ORDER — IBUPROFEN 200 MG
400 TABLET ORAL EVERY 6 HOURS
Qty: 0 | Refills: 0 | Status: DISCONTINUED | OUTPATIENT
Start: 2018-11-30 | End: 2018-12-01

## 2018-11-30 RX ORDER — OXYCODONE HYDROCHLORIDE 5 MG/1
5 TABLET ORAL EVERY 6 HOURS
Qty: 0 | Refills: 0 | Status: DISCONTINUED | OUTPATIENT
Start: 2018-11-30 | End: 2018-12-01

## 2018-11-30 RX ORDER — POTASSIUM CHLORIDE 20 MEQ
40 PACKET (EA) ORAL ONCE
Qty: 0 | Refills: 0 | Status: COMPLETED | OUTPATIENT
Start: 2018-11-30 | End: 2018-11-30

## 2018-11-30 RX ADMIN — CHLORHEXIDINE GLUCONATE 15 MILLILITER(S): 213 SOLUTION TOPICAL at 05:24

## 2018-11-30 RX ADMIN — Medication 1: at 17:27

## 2018-11-30 RX ADMIN — MORPHINE SULFATE 2 MILLIGRAM(S): 50 CAPSULE, EXTENDED RELEASE ORAL at 05:24

## 2018-11-30 RX ADMIN — OXYCODONE HYDROCHLORIDE 5 MILLIGRAM(S): 5 TABLET ORAL at 22:26

## 2018-11-30 RX ADMIN — AMPICILLIN SODIUM AND SULBACTAM SODIUM 200 GRAM(S): 250; 125 INJECTION, POWDER, FOR SUSPENSION INTRAMUSCULAR; INTRAVENOUS at 18:06

## 2018-11-30 RX ADMIN — CHLORHEXIDINE GLUCONATE 15 MILLILITER(S): 213 SOLUTION TOPICAL at 18:07

## 2018-11-30 RX ADMIN — AMPICILLIN SODIUM AND SULBACTAM SODIUM 200 GRAM(S): 250; 125 INJECTION, POWDER, FOR SUSPENSION INTRAMUSCULAR; INTRAVENOUS at 12:10

## 2018-11-30 RX ADMIN — PANTOPRAZOLE SODIUM 40 MILLIGRAM(S): 20 TABLET, DELAYED RELEASE ORAL at 12:10

## 2018-11-30 RX ADMIN — OXYCODONE HYDROCHLORIDE 5 MILLIGRAM(S): 5 TABLET ORAL at 23:35

## 2018-11-30 RX ADMIN — MORPHINE SULFATE 2 MILLIGRAM(S): 50 CAPSULE, EXTENDED RELEASE ORAL at 12:13

## 2018-11-30 RX ADMIN — Medication 40 MILLIEQUIVALENT(S): at 09:50

## 2018-11-30 RX ADMIN — MORPHINE SULFATE 2 MILLIGRAM(S): 50 CAPSULE, EXTENDED RELEASE ORAL at 12:28

## 2018-11-30 RX ADMIN — AMPICILLIN SODIUM AND SULBACTAM SODIUM 200 GRAM(S): 250; 125 INJECTION, POWDER, FOR SUSPENSION INTRAMUSCULAR; INTRAVENOUS at 05:24

## 2018-11-30 NOTE — DISCHARGE NOTE ADULT - CARE PROVIDER_API CALL
[facility] [place]   [phone]  Phone: (   )    -  Fax: (   )    - [facility] [place]   [phone]  Phone: (   )    -  Fax: (   )    -    pcp,   your pcp  Phone: (   )    -  Fax: (   )    -

## 2018-11-30 NOTE — DISCHARGE NOTE ADULT - MEDICATION SUMMARY - MEDICATIONS TO TAKE
I will START or STAY ON the medications listed below when I get home from the hospital:    ibuprofen 100 mg/5 mL oral suspension  -- 20 milliliter(s) by mouth every 6 hours, As needed, Mild Pain (1 - 3), Moderate Pain (4 - 6)  -- Indication: For Closed fracture of mandible    oxyCODONE 5 mg/5 mL oral solution  -- 5 milliliter(s) by mouth every 6 hours, As needed, Severe Pain (7 - 10) MDD:20mL  -- Indication: For Closed fracture of mandible    metFORMIN 1000 mg oral tablet  -- 1 tab(s) by mouth 2 times a day    crush tablet   -- Indication: For diabetes    chlorhexidine 0.12% mucous membrane liquid  -- 15 milliliter(s) mucous membrane 2 times a day   -- Indication: For Closed fracture of mandible    amoxicillin-clavulanate 125 mg-31.25 mg/5 mL oral liquid  -- 35 milliliter(s) by mouth 2 times a day (875mg)  -- Expires___________________  Finish all this medication unless otherwise directed by prescriber.  Refrigerate and shake well.  Expires_______________________  Take with food or milk.    -- Indication: For Closed fracture of mandible

## 2018-11-30 NOTE — DISCHARGE NOTE ADULT - ADDITIONAL INSTRUCTIONS
Follow up in 1 week at [facility] [phone] [place] [address], [id number], [phone].

## 2018-11-30 NOTE — PROGRESS NOTE ADULT - PROBLEM SELECTOR PLAN 2
likely in setting of anesthesia at OSH.  has had some lightheadedness in the past during heavy menses, but never fall in the past.  EKG NSR no acute ischemic changes but low liklihood this is cardiac.  more likely vasovagal.  Non-focal neuro exam, but will check CT head given fall with LOC. likely in setting of anesthesia at OSH.  has had some lightheadedness in the past during heavy menses, but never fall in the past.  EKG NSR no acute ischemic changes but low liklihood this is cardiac.  more likely vasovagal.  Non-focal neuro exam, ct head neg

## 2018-11-30 NOTE — PROGRESS NOTE ADULT - SUBJECTIVE AND OBJECTIVE BOX
39 yo Female w/ R mandibular angle fracture awaiting closed reduction.  Patient examined at bedside. HASMUKH overnight. Patient states pain is well controlled. Yesterday pt decided w/ her  that she no longer wanted to proceed w/ ORIF in the operating room, instead elects to have jaws wired together under local anesthesia.    Vital Signs Last 24 Hrs  T(C): 37.1 (30 Nov 2018 05:12), Max: 37.4 (29 Nov 2018 21:49)  T(F): 98.8 (30 Nov 2018 05:12), Max: 99.3 (29 Nov 2018 21:49)  HR: 99 (30 Nov 2018 05:12) (95 - 101)  BP: 107/73 (30 Nov 2018 05:12) (107/73 - 123/85)  BP(mean): --  RR: 18 (30 Nov 2018 05:12) (16 - 18)  SpO2: 100% (30 Nov 2018 05:12) (100% - 100%)    PE:   Gen: AAOx3, NAD  EOE: R facial edema, palpable bony protrusion appreciated R mandible  MICHELLE: 20mm  IOE: generalized good OH, exo socket #32 visualized, occlusion stable and reproducible, gingiva clean, no signs of infection                          10.4   4.97  )-----------( 396      ( 30 Nov 2018 05:52 )             32.2     11-30    137  |  100  |  8   ----------------------------<  105<H>  3.3<L>   |  22  |  0.59    Ca    8.9      30 Nov 2018 05:52  Phos  3.9     11-29  Mg     1.9     11-29    TPro  7.0  /  Alb  3.9  /  TBili  0.3  /  DBili  x   /  AST  23  /  ALT  39<H>  /  AlkPhos  55  11-29    I&O's Summary

## 2018-11-30 NOTE — DISCHARGE NOTE ADULT - PATIENT PORTAL LINK FT
You can access the [facility] Patient Portal, offered by [facility], by registering with the following website: [email or web address]

## 2018-11-30 NOTE — PROGRESS NOTE ADULT - ASSESSMENT
A/P: 38y Female w/ right mandibular angle fracture declining ORIF in the OR as previously agreed upon, now electing for IMF under local anesthesia.  - Cont soft, non-chew diet  - Cont abx, peridex, pain control  - Plan for closed reduction under local anesthesia  - Awaiting medicine clearance for syncopal episode  - Care as per primary team  - Page OMFS at [id number] w/ any questions or concerns

## 2018-11-30 NOTE — DISCHARGE NOTE ADULT - PROVIDER TOKENS
FREE:[[place] [place]PHONE:[(   )    -],FAX:[(   )    -],ADDRESS:[[phone]]] FREE:[LAST:[OMFS],PHONE:[(   )    -],FAX:[(   )    -],ADDRESS:[[phone]]],FREE:[LAST:[pcp],PHONE:[(   )    -],FAX:[(   )    -],ADDRESS:[your pcp]]

## 2018-11-30 NOTE — DISCHARGE NOTE ADULT - HOSPITAL COURSE
39 yo F with h/o DM presenting with R jaw pain and swelling. 37 yo F with h/o DM presenting with R jaw pain and swelling.      Closed fracture of right side of mandible, unspecified mandibular site, initial encounter.   s/p tooth extraction, likely fracture from fall.   s/p OMFS closed reduction under local anesthesia. liquid diet 4-6 weeks as per OMFS. outpt f/u in 1 week. Motrin, peridex and augmentin x 1week.      Syncope.    likely in setting of anesthesia at OSH.  has had some lightheadedness in the past during heavy menses, but never fall in the past.  EKG NSR no acute ischemic changes but low liklihood this is cardiac.  more likely vasovagal.  Non-focal neuro exam, ct head neg.      Fever.  o/p fever.  reports occasional dysuria but no other symptoms. UA neg. no fevers here.  as above augmemtin as per OMFS.      Type 2 diabetes mellitus without complication,  on metformin at home.  ISS here. there is liquid metformin but not available at [facility] verified with pharmacy metformin can be crushed and mixed with liquid.   A1c-6.9.

## 2018-11-30 NOTE — DISCHARGE NOTE ADULT - HOME CARE AGENCY
[facility]  [phone] .   Nurse to call and visit day after discharge 12/2/2018

## 2018-11-30 NOTE — PROGRESS NOTE ADULT - SUBJECTIVE AND OBJECTIVE BOX
39 yo Female w/ R mandibular angle fracture awaiting closed reduction.  Patient examined at bedside. HASMUKH overnight. Patient states pain is well controlled. Yesterday pt decided w/ her  that she no longer wanted to proceed w/ ORIF in the operating room, instead elects to have jaws wired together under local anesthesia.    38y old  Female who presents with a chief complaint of Jaw pain (29 Nov 2018 11:34), Patient is s/p mechanical fall with LOC in outside hospital. Patient presented to [facility] ED with R mandibular angle fracture near extraction site of #32 recently performed. She was transported to [facility] with  for closed reduction and intermaxillary fixation of R mandibular angle fracture. 38y old  Female who presents with a chief complaint of Jaw pain (29 Nov 2018 11:34), Patient is s/p mechanical fall with LOC in outside hospital. Patient presented to [facility] ED with R mandibular angle fracture near extraction site of #32 recently performed by outside Oral Surgeon. Todat she was transported to [facility] with  for closed reduction and intermaxillary fixation of R mandibular angle fracture. 38y old  Female who presents with a chief complaint of Jaw pain (29 Nov 2018 11:34), Patient is s/p mechanical fall with LOC in outside hospital. Patient presented to [facility] ED with nondispalced R mandibular angle fracture near extraction site of #32 recently performed by outside Oral Surgeon. Today she was transported to [facility] with  for closed reduction and intermaxillary fixation of R mandibular angle fracture.     PAST MEDICAL & SURGICAL HISTORY:  DM2 (diabetes mellitus, type 2)  No significant past surgical history    Allergies  No Known Allergies  Intolerances    Vital Signs Last 24 Hrs  T(C): 36.9 (30 Nov 2018 11:22), Max: 37.4 (29 Nov 2018 21:49)  T(F): 98.4 (30 Nov 2018 11:22), Max: 99.3 (29 Nov 2018 21:49)  HR: 91 (30 Nov 2018 12:14) (91 - 99)  BP: 120/87 (30 Nov 2018 12:14) (107/73 - 138/97)  BP(mean): --  RR: 14 (30 Nov 2018 12:14) (14 - 18)  SpO2: 100% (30 Nov 2018 12:14) (100% - 100%)    MEDICATIONS  (STANDING):  ampicillin/sulbactam  IVPB 3 Gram(s) IV Intermittent every 6 hours  chlorhexidine 0.12% Liquid 15 milliLiter(s) Swish and Spit two times a day  dextrose 5%. 1000 milliLiter(s) (50 mL/Hr) IV Continuous <Continuous>  dextrose 50% Injectable 12.5 Gram(s) IV Push once  dextrose 50% Injectable 25 Gram(s) IV Push once  dextrose 50% Injectable 25 Gram(s) IV Push once  influenza   Vaccine 0.5 milliLiter(s) IntraMuscular once  insulin lispro (HumaLOG) corrective regimen sliding scale   SubCutaneous three times a day before meals  insulin lispro (HumaLOG) corrective regimen sliding scale   SubCutaneous at bedtime  pantoprazole  Injectable 40 milliGRAM(s) IV Push daily  sodium chloride 0.9%. 1000 milliLiter(s) (100 mL/Hr) IV Continuous <Continuous>    PHYSICAL EXAM:  General: AAO X3, in no acute distress, resting comfortably in chair.  Pulm: Equal and Bilateral chest rise, no shortness of breath.  Cardio: RRR  H: Normocephalic, LOC at the time of fall.  E: PERRLA, EOMI, (-) subconjunctival hemorrhage, (-) periorbital swelling, (-) chemosis, (-) hyphema  E: (-) otorrhea, (-) sweeney signs  N:  (-) blood in nares, (-) septal hematoma, (-) epistaxis  T: (-) LAD, Oropharynx moist    Procedure: Closed Reduction internal fixation of nondisplaced R mandibular angle fracture with intermaxillary screws and Ivy loops under local anesthesia.    Patient was consented with  ID [id number] with  in the room. Patient was explained risks, benefits of procedure, including but not limited to pain, swelling, bleeding, infection, damage to adjacent structures, risk of secondary procedures, malocclusion, non-union, mal-union. Patient understood and elects to do procedure under local anesthesia.    Administered 3 carpules of 2% lidocaine with 1:100 K of epi as local infiltration in vestibule near teeth 6, 11, 22, and 27. Placed 12 mm Striker IMF screws between canines and first premolars in UR/ UL Quadrants. Placed 8 mm Striker IMF screws between canines and first premolars in LR/LL. While in balanced occlusion and normal bite, placed two IVY loops 25 gauge wire. Patient tolerated procedure well. 38y old  Female who presents with a chief complaint of Jaw pain (29 Nov 2018 11:34), Patient is s/p mechanical fall with LOC in outside hospital. Patient presented to [facility] ED with nondispalced R mandibular angle fracture near extraction site of #32 recently performed by outside Oral Surgeon. Today she was transported to [facility] with  for closed reduction and intermaxillary fixation of R mandibular angle fracture.     PAST MEDICAL & SURGICAL HISTORY:  DM2 (diabetes mellitus, type 2)  No significant past surgical history    Allergies  No Known Allergies  Intolerances    Vital Signs Last 24 Hrs  T(C): 36.9 (30 Nov 2018 11:22), Max: 37.4 (29 Nov 2018 21:49)  T(F): 98.4 (30 Nov 2018 11:22), Max: 99.3 (29 Nov 2018 21:49)  HR: 91 (30 Nov 2018 12:14) (91 - 99)  BP: 120/87 (30 Nov 2018 12:14) (107/73 - 138/97)  BP(mean): --  RR: 14 (30 Nov 2018 12:14) (14 - 18)  SpO2: 100% (30 Nov 2018 12:14) (100% - 100%)    MEDICATIONS  (STANDING):  ampicillin/sulbactam  IVPB 3 Gram(s) IV Intermittent every 6 hours  chlorhexidine 0.12% Liquid 15 milliLiter(s) Swish and Spit two times a day  dextrose 5%. 1000 milliLiter(s) (50 mL/Hr) IV Continuous <Continuous>  dextrose 50% Injectable 12.5 Gram(s) IV Push once  dextrose 50% Injectable 25 Gram(s) IV Push once  dextrose 50% Injectable 25 Gram(s) IV Push once  influenza   Vaccine 0.5 milliLiter(s) IntraMuscular once  insulin lispro (HumaLOG) corrective regimen sliding scale   SubCutaneous three times a day before meals  insulin lispro (HumaLOG) corrective regimen sliding scale   SubCutaneous at bedtime  pantoprazole  Injectable 40 milliGRAM(s) IV Push daily  sodium chloride 0.9%. 1000 milliLiter(s) (100 mL/Hr) IV Continuous <Continuous>    PHYSICAL EXAM:  General: AAO X3, in no acute distress, resting comfortably in chair.  Pulm: Equal and Bilateral chest rise, no shortness of breath.  Cardio: RRR  H: Normocephalic, LOC at the time of fall.  E: PERRLA, EOMI, (-) subconjunctival hemorrhage, (-) periorbital swelling, (-) chemosis, (-) hyphema  E: (-) otorrhea, (-) sweeney signs  N:  (-) blood in nares, (-) septal hematoma, (-) epistaxis  T: (-) LAD, Oropharynx moist    Procedure: Closed Reduction internal fixation of nondisplaced R mandibular angle fracture with intermaxillary screws and Ivy loops under local anesthesia.    Patient was consented with  ID [id number] with  in the room. Patient was explained risks, benefits of procedure, including but not limited to pain, swelling, bleeding, infection, damage to adjacent structures, risk of secondary procedures, malocclusion, non-union, mal-union. Patient understood and elects to do procedure under local anesthesia.    Administered 3 carpules of 2% lidocaine with 1:100 K of epi as local infiltration in vestibule near teeth 6, 11, 22, and 27. Placed 12 mm Striker IMF screws between canines and first premolars in UR/ UL Quadrants. Placed 8 mm Striker IMF screws between canines and first premolars in LR/LL. While in balanced occlusion and normal bite, placed two IVY loops 25 gauge wire. Post operative instructions given with  (ID [id number]). Patient tolerated procedure well. 38y old  Female who presents with a chief complaint of Jaw pain (29 Nov 2018 11:34), Patient is s/p mechanical fall with LOC in outside hospital. Patient presented to [facility] ED with nondispalced R mandibular angle fracture near extraction site of #32 recently performed by outside Oral Surgeon. Today she was transported to [facility] with  for closed reduction and intermaxillary fixation of R mandibular angle fracture.     PAST MEDICAL & SURGICAL HISTORY:  DM2 (diabetes mellitus, type 2)  No significant past surgical history    Allergies  No Known Allergies  Intolerances    Vital Signs Last 24 Hrs  T(C): 36.9 (30 Nov 2018 11:22), Max: 37.4 (29 Nov 2018 21:49)  T(F): 98.4 (30 Nov 2018 11:22), Max: 99.3 (29 Nov 2018 21:49)  HR: 91 (30 Nov 2018 12:14) (91 - 99)  BP: 120/87 (30 Nov 2018 12:14) (107/73 - 138/97)  BP(mean): --  RR: 14 (30 Nov 2018 12:14) (14 - 18)  SpO2: 100% (30 Nov 2018 12:14) (100% - 100%)    MEDICATIONS  (STANDING):  ampicillin/sulbactam  IVPB 3 Gram(s) IV Intermittent every 6 hours  chlorhexidine 0.12% Liquid 15 milliLiter(s) Swish and Spit two times a day  dextrose 5%. 1000 milliLiter(s) (50 mL/Hr) IV Continuous <Continuous>  dextrose 50% Injectable 12.5 Gram(s) IV Push once  dextrose 50% Injectable 25 Gram(s) IV Push once  dextrose 50% Injectable 25 Gram(s) IV Push once  influenza   Vaccine 0.5 milliLiter(s) IntraMuscular once  insulin lispro (HumaLOG) corrective regimen sliding scale   SubCutaneous three times a day before meals  insulin lispro (HumaLOG) corrective regimen sliding scale   SubCutaneous at bedtime  pantoprazole  Injectable 40 milliGRAM(s) IV Push daily  sodium chloride 0.9%. 1000 milliLiter(s) (100 mL/Hr) IV Continuous <Continuous>    PHYSICAL EXAM:  General: AAO X3, in no acute distress, resting comfortably in chair.  Pulm: Equal and Bilateral chest rise, no shortness of breath.  Cardio: RRR  H: Normocephalic, LOC at the time of fall.  E: PERRLA, EOMI, (-) subconjunctival hemorrhage, (-) periorbital swelling, (-) chemosis, (-) hyphema  E: (-) otorrhea, (-) sweeney signs  N:  (-) blood in nares, (-) septal hematoma, (-) epistaxis  T: (-) LAD, Oropharynx moist    Procedure: Closed Reduction internal fixation of nondisplaced R mandibular angle fracture with intermaxillary screws and Ivy loops under local anesthesia.    Patient was consented with  ID [id number] with  in the room. Patient was explained risks, benefits of procedure, including but not limited to pain, swelling, bleeding, infection, damage to adjacent structures, risk of secondary procedures, malocclusion, non-union, mal-union. Patient understood and elects to do procedure under local anesthesia.    Administered 3 carpules of 2% lidocaine with 1:100 K of epi as local infiltration in vestibule near teeth 6, 11, 22, and 27. Placed 12 mm Striker IMF screws between canines and first premolars in UR/ UL Quadrants. Placed 8 mm Striker IMF screws between canines and first premolars in LR/LL. While in balanced occlusion and normal bite, placed two IVY loops 25 gauge wire. Post operative instructions given with  (ID [id number]). Patient tolerated procedure well.      Post Operative Pan: Condyles in Fossa, patient in IMF, R angle mandible fracture remains non-displaced and well reduced.

## 2018-11-30 NOTE — PROGRESS NOTE ADULT - PROBLEM SELECTOR PLAN 1
s/p tooth extraction, likely fracture from fall.  OMFS to do bedside closed reduction under local anesthesia. Pt denies any SOB/CP on exertion no premature hx of HD 0/6 RCRI pt medically optimized for planned surgery.  pain control, requiring IV opioids

## 2018-11-30 NOTE — DISCHARGE NOTE ADULT - SECONDARY DIAGNOSIS.
Fever Syncope Type 2 diabetes mellitus without complication, without long-term current use of insulin

## 2018-11-30 NOTE — DISCHARGE NOTE ADULT - MEDICATION SUMMARY - MEDICATIONS TO CHANGE
MUSCULOSKELETAL - ADULT    • Return mobility to safest level of function Not Progressing        SKIN/TISSUE INTEGRITY - ADULT    • Skin integrity remains intact Not Progressing          MUSCULOSKELETAL - ADULT    • Maintain proper alignment of affected bod Bed locked and in lowest position. Non-skid socks in place. Side rails up. Call light reinforced and within reach. Bed alarm in place. No other acute changes at this time. Will continue to monitor. I will SWITCH the dose or number of times a day I take the medications listed below when I get home from the hospital:  None

## 2018-11-30 NOTE — PROGRESS NOTE ADULT - PROBLEM SELECTOR PLAN 3
o/p fever.  reports occasional dysuria but no other symptoms. UA neg. no fevers here.  will continue on unasyn for an infected fracture.

## 2018-11-30 NOTE — PROGRESS NOTE ADULT - ASSESSMENT
37 yo F s/p closed reduction and internal fixation of non-displaced R angle fracture with Striker Intermaxillary fixation screws and IVY loops by [facility]    PLAN:  - Patient to follow up in 1 week [place] [facility] [phone] [place] [address], [address], [phone].   - Patient to be in full liquid diet for 4-6 weeks.  - Recommend Liquid Motrin, Peridex, liquid augmentin 875 mg 2x per day for 1 week.  - Patient to be discharged with liquid home medications.  - No pressure to face  - Pt OK to be discharged from [facility] standpoint. 37 yo F s/p closed reduction and internal fixation of non-displaced R angle fracture with Striker Intermaxillary fixation screws and IVY loops by [facility].    PLAN:  - Patient to follow up in 1 week at [facility] clinic [phone] [place] [address], [address], [phone].   - Patient to be in full liquid diet for 4-6 weeks.  - Recommend Liquid Motrin, Peridex, liquid augmentin 875 mg 2x per day for 1 week.  - Patient to be discharged with liquid home medications.  - No pressure to face  - Pt to carry wire cutters with her at all times and use only in emergency  - Pt OK to be discharged from [facility] standpoint.

## 2018-11-30 NOTE — PROGRESS NOTE ADULT - SUBJECTIVE AND OBJECTIVE BOX
Patient is a 38y old  Female who presents with a chief complaint of Jaw pain (2018 11:52)      SUBJECTIVE / OVERNIGHT EVENTS: Pt in NAD no fever/SOB/CP    MEDICATIONS  (STANDING):  ampicillin/sulbactam  IVPB 3 Gram(s) IV Intermittent every 6 hours  chlorhexidine 0.12% Liquid 15 milliLiter(s) Swish and Spit two times a day  dextrose 5%. 1000 milliLiter(s) (50 mL/Hr) IV Continuous <Continuous>  dextrose 50% Injectable 12.5 Gram(s) IV Push once  dextrose 50% Injectable 25 Gram(s) IV Push once  dextrose 50% Injectable 25 Gram(s) IV Push once  influenza   Vaccine 0.5 milliLiter(s) IntraMuscular once  insulin lispro (HumaLOG) corrective regimen sliding scale   SubCutaneous three times a day before meals  insulin lispro (HumaLOG) corrective regimen sliding scale   SubCutaneous at bedtime  pantoprazole  Injectable 40 milliGRAM(s) IV Push daily  sodium chloride 0.9%. 1000 milliLiter(s) (100 mL/Hr) IV Continuous <Continuous>    MEDICATIONS  (PRN):  acetaminophen   Tablet .. 650 milliGRAM(s) Oral every 6 hours PRN Temp greater or equal to 38C (100.4F)  dextrose 40% Gel 15 Gram(s) Oral once PRN Blood Glucose LESS THAN 70 milliGRAM(s)/deciliter  glucagon  Injectable 1 milliGRAM(s) IntraMuscular once PRN Glucose LESS THAN 70 milligrams/deciliter  morphine  - Injectable 2 milliGRAM(s) IV Push every 4 hours PRN Severe Pain (7 - 10)  ondansetron Injectable 4 milliGRAM(s) IV Push every 8 hours PRN Nausea and/or Vomiting  oxyCODONE    IR 5 milliGRAM(s) Oral every 6 hours PRN Moderate Pain (4 - 6)        CAPILLARY BLOOD GLUCOSE      POCT Blood Glucose.: 120 mg/dL (2018 07:33)  POCT Blood Glucose.: 81 mg/dL (2018 22:00)  POCT Blood Glucose.: 122 mg/dL (2018 16:43)  POCT Blood Glucose.: 105 mg/dL (2018 11:55)    I&O's Summary      T(C): 37.1 (18 @ 05:12), Max: 37.4 (11-29-18 @ 21:49)  HR: 99 (18 @ 05:12) (95 - 101)  BP: 107/73 (18 @ 05:12) (107/73 - 123/85)  RR: 18 (18 @ 05:12) (16 - 18)  SpO2: 100% (18 @ 05:12) (100% - 100%)    PHYSICAL EXAM:  GENERAL: NAD, well-developed  HEAD:  mild Rt jaw swelling   EYES: EOMI,  conjunctiva and sclera clear  NECK: Supple, No JVD  CHEST/LUNG: Clear to auscultation bilaterally; No wheeze  HEART: Regular rate and rhythm; No murmurs, rubs, or gallops  ABDOMEN: Soft, Nontender,Bowel sounds present  EXTREMITIES:  2+ Peripheral Pulses, No clubbing, cyanosis, or edema  PSYCH: AAOx3      LABS:                        10.4   4.97  )-----------( 396      ( 2018 05:52 )             32.2         137  |  100  |  8   ----------------------------<  105<H>  3.3<L>   |  22  |  0.59    Ca    8.9      2018 05:52  Phos  3.9       Mg     1.9         TPro  7.0  /  Alb  3.9  /  TBili  0.3  /  DBili  x   /  AST  23  /  ALT  39<H>  /  AlkPhos  55            Urinalysis Basic - ( 2018 05:29 )    Color: LIGHT YELLOW / Appearance: CLEAR / S.021 / pH: 7.0  Gluc: 70 / Ketone: SMALL  / Bili: NEGATIVE / Urobili: NORMAL   Blood: NEGATIVE / Protein: NEGATIVE / Nitrite: NEGATIVE   Leuk Esterase: NEGATIVE / RBC: x / WBC x   Sq Epi: x / Non Sq Epi: x / Bacteria: x          RADIOLOGY & ADDITIONAL TESTS:    Imaging Personally Reviewed:< from: CT Head No Cont (18 @ 14:39) >  IMPRESSION:   No CT evidence of acute intracranial hemorrhage, brain edema, or mass   effect.     < end of copied text >      Consultant(s) Notes Reviewed:      Care Discussed with Consultants/Other Providers: Patient is a 38y old  Female who presents with a chief complaint of Jaw pain (2018 11:52)      SUBJECTIVE / OVERNIGHT EVENTS: Pt in NAD no fever/SOB/CP    MEDICATIONS  (STANDING):  ampicillin/sulbactam  IVPB 3 Gram(s) IV Intermittent every 6 hours  chlorhexidine 0.12% Liquid 15 milliLiter(s) Swish and Spit two times a day  dextrose 5%. 1000 milliLiter(s) (50 mL/Hr) IV Continuous <Continuous>  dextrose 50% Injectable 12.5 Gram(s) IV Push once  dextrose 50% Injectable 25 Gram(s) IV Push once  dextrose 50% Injectable 25 Gram(s) IV Push once  influenza   Vaccine 0.5 milliLiter(s) IntraMuscular once  insulin lispro (HumaLOG) corrective regimen sliding scale   SubCutaneous three times a day before meals  insulin lispro (HumaLOG) corrective regimen sliding scale   SubCutaneous at bedtime  pantoprazole  Injectable 40 milliGRAM(s) IV Push daily  sodium chloride 0.9%. 1000 milliLiter(s) (100 mL/Hr) IV Continuous <Continuous>    MEDICATIONS  (PRN):  acetaminophen   Tablet .. 650 milliGRAM(s) Oral every 6 hours PRN Temp greater or equal to 38C (100.4F)  dextrose 40% Gel 15 Gram(s) Oral once PRN Blood Glucose LESS THAN 70 milliGRAM(s)/deciliter  glucagon  Injectable 1 milliGRAM(s) IntraMuscular once PRN Glucose LESS THAN 70 milligrams/deciliter  morphine  - Injectable 2 milliGRAM(s) IV Push every 4 hours PRN Severe Pain (7 - 10)  ondansetron Injectable 4 milliGRAM(s) IV Push every 8 hours PRN Nausea and/or Vomiting  oxyCODONE    IR 5 milliGRAM(s) Oral every 6 hours PRN Moderate Pain (4 - 6)        CAPILLARY BLOOD GLUCOSE      POCT Blood Glucose.: 120 mg/dL (2018 07:33)  POCT Blood Glucose.: 81 mg/dL (2018 22:00)  POCT Blood Glucose.: 122 mg/dL (2018 16:43)  POCT Blood Glucose.: 105 mg/dL (2018 11:55)    I&O's Summary      T(C): 37.1 (18 @ 05:12), Max: 37.4 (11-29-18 @ 21:49)  HR: 99 (18 @ 05:12) (95 - 101)  BP: 107/73 (18 @ 05:12) (107/73 - 123/85)  RR: 18 (18 @ 05:12) (16 - 18)  SpO2: 100% (18 @ 05:12) (100% - 100%)    PHYSICAL EXAM:  GENERAL: NAD, well-developed  HEAD:  mild Rt jaw swelling   EYES: EOMI,  conjunctiva and sclera clear  NECK: Supple, No JVD  CHEST/LUNG: Clear to auscultation bilaterally; No wheeze  HEART: Regular rate and rhythm; No murmurs, rubs, or gallops  ABDOMEN: Soft, Nontender,Bowel sounds present  EXTREMITIES:  2+ Peripheral Pulses, No clubbing, cyanosis, or edema  PSYCH: AAOx3      LABS:                        10.4   4.97  )-----------( 396      ( 2018 05:52 )             32.2         137  |  100  |  8   ----------------------------<  105<H>  3.3<L>   |  22  |  0.59    Ca    8.9      2018 05:52  Phos  3.9       Mg     1.9         TPro  7.0  /  Alb  3.9  /  TBili  0.3  /  DBili  x   /  AST  23  /  ALT  39<H>  /  AlkPhos  55            Urinalysis Basic - ( 2018 05:29 )    Color: LIGHT YELLOW / Appearance: CLEAR / S.021 / pH: 7.0  Gluc: 70 / Ketone: SMALL  / Bili: NEGATIVE / Urobili: NORMAL   Blood: NEGATIVE / Protein: NEGATIVE / Nitrite: NEGATIVE   Leuk Esterase: NEGATIVE / RBC: x / WBC x   Sq Epi: x / Non Sq Epi: x / Bacteria: x          RADIOLOGY & ADDITIONAL TESTS:    Imaging Personally Reviewed:< from: CT Head No Cont (18 @ 14:39) >  IMPRESSION:   No CT evidence of acute intracranial hemorrhage, brain edema, or mass   effect.     < end of copied text >    EKG-SR 90 BPM no acute ischemic changes  Consultant(s) Notes Reviewed:      Care Discussed with Consultants/Other Providers:

## 2018-11-30 NOTE — DISCHARGE NOTE ADULT - PLAN OF CARE
[place] [facility], which includes being wired shut for 4-6+ weeks.  follow up in 1 week [place] [facility] [phone] [place] [address], [place], [address], [phone].   - Patient to be in full liquid diet for 4-6 weeks.  - Recommend Liquid Motrin, Peridex, liquid augmentin 875 mg 2x per day for 1 week.  - Patient to be discharged with liquid home medications.  - No pressure to face resolved stable a1c- 6.9  diabetic diet [facility], which includes being wired shut for 4-6+ weeks.  [place] [facility] [phone] [place] [address], [place], [address], [phone].   - Patient to be in full liquid diet for 4-6 weeks.  - Recommend Liquid Motrin, Peridex, liquid augmentin 875 mg 2x per day for 1 week.  - Patient to be discharged with liquid home medications.  - No pressure to face  KEEP WIRE CUTTERS ON YOU AT ALL TIMES c/w Motrin for pain/fever control related to anesthesia from previous procedure. Stay hydrated, follow full liquid diet for 6 weeks.   Follow up with ENT in 1 week CRUSH METFORMIN** a1c- 6.9, diabetic diet; sugar free liquids   **CRUSH METFORMIN**

## 2018-11-30 NOTE — DISCHARGE NOTE ADULT - CARE PLAN
Principal Discharge DX:	Mandibular fracture  Goal:	management  Assessment and plan of treatment:	[facility]- [place] closed reduction, which includes being wired shut for 4-6+ weeks.  follow up in 1 week at [facility] clinic [phone] [place] [address], [address], [phone].   - Patient to be in full liquid diet for 4-6 weeks.  - Recommend Liquid Motrin, Peridex, liquid augmentin 875 mg 2x per day for 1 week.  - Patient to be discharged with liquid home medications.  - No pressure to face  Secondary Diagnosis:	Fever  Goal:	resolved  Secondary Diagnosis:	Syncope  Goal:	stable  Secondary Diagnosis:	Type 2 diabetes mellitus without complication, without long-term current use of insulin  Assessment and plan of treatment:	a1c- 6.9  diabetic diet Principal Discharge DX:	Mandibular fracture  Goal:	management  Assessment and plan of treatment[place]	[facility], which includes being wired shut for 4-6+ weeks.  [place] [facility] [place] [phone] [place] [address], [address], [phone].   - Patient to be in full liquid diet for 4-6 weeks.  - Recommend Liquid Motrin, Peridex, liquid augmentin 875 mg 2x per day for 1 week.  - Patient to be discharged with liquid home medications.  - No pressure to face  KEEP WIRE CUTTERS ON YOU AT ALL TIMES  Secondary Diagnosis:	Fever  Goal:	resolved  Assessment and plan of treatment:	c/w Motrin for pain/fever control  Secondary Diagnosis:	Syncope  Goal:	stable  Assessment and plan of treatment:	related to anesthesia from previous procedure. Stay hydrated, follow full liquid diet for 6 weeks.   Follow up with ENT in 1 week  Secondary Diagnosis:	Type 2 diabetes mellitus without complication, without long-term current use of insulin  Goal:	CRUSH METFORMIN**  Assessment and plan of treatment:	a1c- 6.9, diabetic diet; sugar free liquids   **CRUSH METFORMIN**

## 2018-11-30 NOTE — PROGRESS NOTE ADULT - ASSESSMENT
38 F, DM, recent surgery for impacted molar and post-op syncope/fall, found to have mandibular fracture and o/p fever, concern for post-op infection 38 F, DM, recent surgery for impacted molar and post-op syncope/fall, found to have mandibular fracture and o/p fever, concern for infected fracture.

## 2018-12-01 VITALS
TEMPERATURE: 98 F | DIASTOLIC BLOOD PRESSURE: 81 MMHG | OXYGEN SATURATION: 100 % | SYSTOLIC BLOOD PRESSURE: 116 MMHG | HEART RATE: 100 BPM | RESPIRATION RATE: 18 BRPM

## 2018-12-01 LAB
BUN SERPL-MCNC: 8 MG/DL — SIGNIFICANT CHANGE UP (ref 7–23)
CALCIUM SERPL-MCNC: 9.1 MG/DL — SIGNIFICANT CHANGE UP (ref 8.4–10.5)
CHLORIDE SERPL-SCNC: 101 MMOL/L — SIGNIFICANT CHANGE UP (ref 98–107)
CO2 SERPL-SCNC: 18 MMOL/L — LOW (ref 22–31)
CREAT SERPL-MCNC: 0.6 MG/DL — SIGNIFICANT CHANGE UP (ref 0.5–1.3)
GLUCOSE SERPL-MCNC: 169 MG/DL — HIGH (ref 70–99)
HCT VFR BLD CALC: 34.4 % — LOW (ref 34.5–45)
HGB BLD-MCNC: 11 G/DL — LOW (ref 11.5–15.5)
MCHC RBC-ENTMCNC: 25.5 PG — LOW (ref 27–34)
MCHC RBC-ENTMCNC: 32 % — SIGNIFICANT CHANGE UP (ref 32–36)
MCV RBC AUTO: 79.8 FL — LOW (ref 80–100)
NRBC # FLD: 0 — SIGNIFICANT CHANGE UP
PLATELET # BLD AUTO: 419 K/UL — HIGH (ref 150–400)
PMV BLD: 9.4 FL — SIGNIFICANT CHANGE UP (ref 7–13)
POTASSIUM SERPL-MCNC: 3.6 MMOL/L — SIGNIFICANT CHANGE UP (ref 3.5–5.3)
POTASSIUM SERPL-SCNC: 3.6 MMOL/L — SIGNIFICANT CHANGE UP (ref 3.5–5.3)
RBC # BLD: 4.31 M/UL — SIGNIFICANT CHANGE UP (ref 3.8–5.2)
RBC # FLD: 12.9 % — SIGNIFICANT CHANGE UP (ref 10.3–14.5)
SODIUM SERPL-SCNC: 135 MMOL/L — SIGNIFICANT CHANGE UP (ref 135–145)
WBC # BLD: 5.27 K/UL — SIGNIFICANT CHANGE UP (ref 3.8–10.5)
WBC # FLD AUTO: 5.27 K/UL — SIGNIFICANT CHANGE UP (ref 3.8–10.5)

## 2018-12-01 PROCEDURE — 99239 HOSP IP/OBS DSCHRG MGMT >30: CPT

## 2018-12-01 RX ORDER — IBUPROFEN 200 MG
20 TABLET ORAL
Qty: 5000 | Refills: 0 | OUTPATIENT
Start: 2018-12-01 | End: 2018-12-30

## 2018-12-01 RX ORDER — METFORMIN HYDROCHLORIDE 850 MG/1
1 TABLET ORAL
Qty: 0 | Refills: 0 | COMMUNITY

## 2018-12-01 RX ORDER — OXYCODONE HYDROCHLORIDE 5 MG/1
5 TABLET ORAL
Qty: 150 | Refills: 0 | OUTPATIENT
Start: 2018-12-01 | End: 2018-12-05

## 2018-12-01 RX ORDER — CHLORHEXIDINE GLUCONATE 213 G/1000ML
15 SOLUTION TOPICAL
Qty: 1000 | Refills: 0 | OUTPATIENT
Start: 2018-12-01 | End: 2018-12-30

## 2018-12-01 RX ADMIN — PANTOPRAZOLE SODIUM 40 MILLIGRAM(S): 20 TABLET, DELAYED RELEASE ORAL at 12:27

## 2018-12-01 RX ADMIN — OXYCODONE HYDROCHLORIDE 5 MILLIGRAM(S): 5 TABLET ORAL at 12:29

## 2018-12-01 RX ADMIN — Medication 400 MILLIGRAM(S): at 03:30

## 2018-12-01 RX ADMIN — Medication 875 MILLIGRAM(S): at 05:50

## 2018-12-01 RX ADMIN — Medication 1: at 12:26

## 2018-12-01 RX ADMIN — OXYCODONE HYDROCHLORIDE 5 MILLIGRAM(S): 5 TABLET ORAL at 13:00

## 2018-12-01 RX ADMIN — CHLORHEXIDINE GLUCONATE 15 MILLILITER(S): 213 SOLUTION TOPICAL at 05:50

## 2018-12-01 RX ADMIN — Medication 400 MILLIGRAM(S): at 02:45

## 2018-12-01 NOTE — PROGRESS NOTE ADULT - PROBLEM SELECTOR PLAN 1
s/p tooth extraction, likely fracture from fall.   s/p OMFS closed reduction under local anesthesia. liquid diet 4-6 weeks as per OMFS. outpt f/u in 1 week. Motrin, peridex and augmentin x 1week

## 2018-12-01 NOTE — PROGRESS NOTE ADULT - ASSESSMENT
38 F, DM, recent surgery for impacted molar and post-op syncope/fall, found to have mandibular fracture and o/p fever, concern for infected fracture.

## 2018-12-01 NOTE — PROGRESS NOTE ADULT - PROBLEM SELECTOR PLAN 2
likely in setting of anesthesia at OSH.  has had some lightheadedness in the past during heavy menses, but never fall in the past.  EKG NSR no acute ischemic changes but low liklihood this is cardiac.  more likely vasovagal.  Non-focal neuro exam, ct head neg

## 2018-12-01 NOTE — PROGRESS NOTE ADULT - PROBLEM SELECTOR PLAN 4
on metformin at home.  ISS here. there is liquid metformin but not available at [facility] verified with pharmacy metformin can be crushed and mixed with liquid.   A1c-6.9

## 2018-12-01 NOTE — PROGRESS NOTE ADULT - PROBLEM SELECTOR PLAN 3
o/p fever.  reports occasional dysuria but no other symptoms. UA neg. no fevers here.  as above augmemtin as per OMFS.

## 2018-12-01 NOTE — PROGRESS NOTE ADULT - SUBJECTIVE AND OBJECTIVE BOX
Patient is a 38y old  Female who presents with a chief complaint of Jaw pain (2018 16:34)      SUBJECTIVE / OVERNIGHT EVENTS: s/p closed reduction and internal fixation. afebrile    MEDICATIONS  (STANDING):  amoxicillin  120 mG/mL/clavulanate Suspension 875 milliGRAM(s) Oral two times a day  chlorhexidine 0.12% Liquid 15 milliLiter(s) Swish and Spit two times a day  dextrose 5%. 1000 milliLiter(s) (50 mL/Hr) IV Continuous <Continuous>  dextrose 50% Injectable 12.5 Gram(s) IV Push once  dextrose 50% Injectable 25 Gram(s) IV Push once  dextrose 50% Injectable 25 Gram(s) IV Push once  influenza   Vaccine 0.5 milliLiter(s) IntraMuscular once  insulin lispro (HumaLOG) corrective regimen sliding scale   SubCutaneous three times a day before meals  insulin lispro (HumaLOG) corrective regimen sliding scale   SubCutaneous at bedtime  pantoprazole  Injectable 40 milliGRAM(s) IV Push daily  sodium chloride 0.9%. 1000 milliLiter(s) (100 mL/Hr) IV Continuous <Continuous>    MEDICATIONS  (PRN):  dextrose 40% Gel 15 Gram(s) Oral once PRN Blood Glucose LESS THAN 70 milliGRAM(s)/deciliter  glucagon  Injectable 1 milliGRAM(s) IntraMuscular once PRN Glucose LESS THAN 70 milligrams/deciliter  ibuprofen  Suspension. 400 milliGRAM(s) Oral every 6 hours PRN Mild Pain (1 - 3), Moderate Pain (4 - 6)  ondansetron Injectable 4 milliGRAM(s) IV Push every 8 hours PRN Nausea and/or Vomiting  oxyCODONE    Solution 5 milliGRAM(s) Oral every 6 hours PRN Severe Pain (7 - 10)        CAPILLARY BLOOD GLUCOSE      POCT Blood Glucose.: 150 mg/dL (01 Dec 2018 07:39)  POCT Blood Glucose.: 164 mg/dL (2018 21:45)  POCT Blood Glucose.: 189 mg/dL (2018 16:41)  POCT Blood Glucose.: 129 mg/dL (2018 12:19)    I&O's Summary    2018 07:01  -  01 Dec 2018 07:00  --------------------------------------------------------  IN: 200 mL / OUT: 0 mL / NET: 200 mL        T(C): 36.6 (18 @ 06:10), Max: 36.9 (18 @ 11:22)  HR: 100 (18 @ 06:10) (91 - 100)  BP: 116/81 (18 @ 06:10) (116/81 - 138/97)  RR: 18 (18 @ 06:10) (14 - 18)  SpO2: 100% (18 @ 06:10) (100% - 100%)    PHYSICAL EXAM:  GENERAL: NAD, well-developed  HEAD:, Normocephalic  EYES: EOMI, conjunctiva and sclera clear  NECK: Supple, No JVD  CHEST/LUNG: Clear to auscultation bilaterally; No wheeze  HEART: Regular rate and rhythm; No murmurs, rubs, or gallops  ABDOMEN: Soft, Nontender, Nondistended; Bowel sounds present  EXTREMITIES:  2+ Peripheral Pulses, No clubbing, cyanosis, or edema      LABS:                        11.0   5.27  )-----------( 419      ( 01 Dec 2018 05:30 )             34.4         135  |  101  |  8   ----------------------------<  169<H>  3.6   |  18<L>  |  0.60    Ca    9.1      01 Dec 2018 05:30            Urinalysis Basic - ( 2018 05:29 )    Color: LIGHT YELLOW / Appearance: CLEAR / S.021 / pH: 7.0  Gluc: 70 / Ketone: SMALL  / Bili: NEGATIVE / Urobili: NORMAL   Blood: NEGATIVE / Protein: NEGATIVE / Nitrite: NEGATIVE   Leuk Esterase: NEGATIVE / RBC: x / WBC x   Sq Epi: x / Non Sq Epi: x / Bacteria: x          RADIOLOGY & ADDITIONAL TESTS:    Imaging Personally Reviewed:    Consultant(s) Notes Reviewed:      Care Discussed with Consultants/Other Providers:

## 2021-03-30 ENCOUNTER — EMERGENCY (EMERGENCY)
Facility: HOSPITAL | Age: 41
LOS: 1 days | Discharge: HOME | End: 2021-03-30
Attending: EMERGENCY MEDICINE | Admitting: STUDENT IN AN ORGANIZED HEALTH CARE EDUCATION/TRAINING PROGRAM
Payer: COMMERCIAL

## 2021-03-30 VITALS
SYSTOLIC BLOOD PRESSURE: 136 MMHG | TEMPERATURE: 98 F | HEIGHT: 64 IN | OXYGEN SATURATION: 100 % | DIASTOLIC BLOOD PRESSURE: 86 MMHG | HEART RATE: 96 BPM | RESPIRATION RATE: 16 BRPM

## 2021-03-30 LAB
ALBUMIN SERPL ELPH-MCNC: 4.2 G/DL — SIGNIFICANT CHANGE UP (ref 3.3–5)
ALP SERPL-CCNC: 74 U/L — SIGNIFICANT CHANGE UP (ref 40–120)
ALT FLD-CCNC: 34 U/L — HIGH (ref 4–33)
ANION GAP SERPL CALC-SCNC: 11 MMOL/L — SIGNIFICANT CHANGE UP (ref 7–14)
APPEARANCE UR: CLEAR — SIGNIFICANT CHANGE UP
APTT BLD: 28.3 SEC — SIGNIFICANT CHANGE UP (ref 27–36.3)
AST SERPL-CCNC: 30 U/L — SIGNIFICANT CHANGE UP (ref 4–32)
BACTERIA # UR AUTO: NEGATIVE — SIGNIFICANT CHANGE UP
BASOPHILS # BLD AUTO: 0.01 K/UL — SIGNIFICANT CHANGE UP (ref 0–0.2)
BASOPHILS NFR BLD AUTO: 0.2 % — SIGNIFICANT CHANGE UP (ref 0–2)
BILIRUB SERPL-MCNC: <0.2 MG/DL — SIGNIFICANT CHANGE UP (ref 0.2–1.2)
BILIRUB UR-MCNC: NEGATIVE — SIGNIFICANT CHANGE UP
BLD GP AB SCN SERPL QL: NEGATIVE — SIGNIFICANT CHANGE UP
BUN SERPL-MCNC: 7 MG/DL — SIGNIFICANT CHANGE UP (ref 7–23)
CALCIUM SERPL-MCNC: 9 MG/DL — SIGNIFICANT CHANGE UP (ref 8.4–10.5)
CHLORIDE SERPL-SCNC: 103 MMOL/L — SIGNIFICANT CHANGE UP (ref 98–107)
CO2 SERPL-SCNC: 23 MMOL/L — SIGNIFICANT CHANGE UP (ref 22–31)
COLOR SPEC: SIGNIFICANT CHANGE UP
CREAT SERPL-MCNC: 0.47 MG/DL — LOW (ref 0.5–1.3)
DIFF PNL FLD: ABNORMAL
EOSINOPHIL # BLD AUTO: 0.11 K/UL — SIGNIFICANT CHANGE UP (ref 0–0.5)
EOSINOPHIL NFR BLD AUTO: 2.3 % — SIGNIFICANT CHANGE UP (ref 0–6)
EPI CELLS # UR: 1 /HPF — SIGNIFICANT CHANGE UP (ref 0–5)
GLUCOSE SERPL-MCNC: 115 MG/DL — HIGH (ref 70–99)
GLUCOSE UR QL: ABNORMAL
HCT VFR BLD CALC: 30.4 % — LOW (ref 34.5–45)
HGB BLD-MCNC: 9.1 G/DL — LOW (ref 11.5–15.5)
HYALINE CASTS # UR AUTO: 0 /LPF — SIGNIFICANT CHANGE UP (ref 0–7)
IANC: 2.5 K/UL — SIGNIFICANT CHANGE UP (ref 1.5–8.5)
IMM GRANULOCYTES NFR BLD AUTO: 0.6 % — SIGNIFICANT CHANGE UP (ref 0–1.5)
INR BLD: 0.99 RATIO — SIGNIFICANT CHANGE UP (ref 0.88–1.16)
KETONES UR-MCNC: NEGATIVE — SIGNIFICANT CHANGE UP
LEUKOCYTE ESTERASE UR-ACNC: NEGATIVE — SIGNIFICANT CHANGE UP
LYMPHOCYTES # BLD AUTO: 1.71 K/UL — SIGNIFICANT CHANGE UP (ref 1–3.3)
LYMPHOCYTES # BLD AUTO: 35.3 % — SIGNIFICANT CHANGE UP (ref 13–44)
MCHC RBC-ENTMCNC: 23.3 PG — LOW (ref 27–34)
MCHC RBC-ENTMCNC: 29.9 GM/DL — LOW (ref 32–36)
MCV RBC AUTO: 77.9 FL — LOW (ref 80–100)
MONOCYTES # BLD AUTO: 0.49 K/UL — SIGNIFICANT CHANGE UP (ref 0–0.9)
MONOCYTES NFR BLD AUTO: 10.1 % — SIGNIFICANT CHANGE UP (ref 2–14)
NEUTROPHILS # BLD AUTO: 2.5 K/UL — SIGNIFICANT CHANGE UP (ref 1.8–7.4)
NEUTROPHILS NFR BLD AUTO: 51.5 % — SIGNIFICANT CHANGE UP (ref 43–77)
NITRITE UR-MCNC: NEGATIVE — SIGNIFICANT CHANGE UP
NRBC # BLD: 0 /100 WBCS — SIGNIFICANT CHANGE UP
NRBC # FLD: 0 K/UL — SIGNIFICANT CHANGE UP
PH UR: 7.5 — SIGNIFICANT CHANGE UP (ref 5–8)
PLATELET # BLD AUTO: 301 K/UL — SIGNIFICANT CHANGE UP (ref 150–400)
POTASSIUM SERPL-MCNC: 3.9 MMOL/L — SIGNIFICANT CHANGE UP (ref 3.5–5.3)
POTASSIUM SERPL-SCNC: 3.9 MMOL/L — SIGNIFICANT CHANGE UP (ref 3.5–5.3)
PROT SERPL-MCNC: 7.4 G/DL — SIGNIFICANT CHANGE UP (ref 6–8.3)
PROT UR-MCNC: ABNORMAL
PROTHROM AB SERPL-ACNC: 11.4 SEC — SIGNIFICANT CHANGE UP (ref 10.6–13.6)
RBC # BLD: 3.9 M/UL — SIGNIFICANT CHANGE UP (ref 3.8–5.2)
RBC # FLD: 15 % — HIGH (ref 10.3–14.5)
RBC CASTS # UR COMP ASSIST: 135 /HPF — HIGH (ref 0–4)
RH IG SCN BLD-IMP: POSITIVE — SIGNIFICANT CHANGE UP
SODIUM SERPL-SCNC: 137 MMOL/L — SIGNIFICANT CHANGE UP (ref 135–145)
SP GR SPEC: 1.02 — SIGNIFICANT CHANGE UP (ref 1.01–1.02)
UROBILINOGEN FLD QL: SIGNIFICANT CHANGE UP
WBC # BLD: 4.85 K/UL — SIGNIFICANT CHANGE UP (ref 3.8–10.5)
WBC # FLD AUTO: 4.85 K/UL — SIGNIFICANT CHANGE UP (ref 3.8–10.5)
WBC UR QL: 2 /HPF — SIGNIFICANT CHANGE UP (ref 0–5)

## 2021-03-30 PROCEDURE — 99285 EMERGENCY DEPT VISIT HI MDM: CPT

## 2021-03-30 PROCEDURE — 76830 TRANSVAGINAL US NON-OB: CPT | Mod: 26

## 2021-03-30 NOTE — ED PROVIDER NOTE - PATIENT PORTAL LINK FT
You can access the FollowMyHealth Patient Portal offered by [facility] by registering at the following website: [email or web address]. By joining [employer]’s FollowMyHealth portal, you will also be able to view your health information using other applications (apps) compatible with our system.

## 2021-03-30 NOTE — ED PROVIDER NOTE - CLINICAL SUMMARY MEDICAL DECISION MAKING FREE TEXT BOX
40 Y/O female with heavy vaginal bleeding since about 3 weeks ago. Hemodynamically stable. Dysfunctional uterine bleeding and    menorrhagia concerning for hematocolpos. Plan for labs to check H&H and transvaginal ultrasound. 42 Y/O female with heavy vaginal bleeding since about 3 weeks ago. Hemodynamically stable. Imp: Dysfunctional uterine bleeding and    menorrhagia concerning for hematocolpos. Plan for labs to check H&H and transvaginal ultrasound. reassess.

## 2021-03-30 NOTE — ED PROVIDER NOTE - NSFOLLOWUPINSTRUCTIONS_ED_ALL_ED_FT
Follow up with your OB/GYN within 48-72 hrs. Show copies of your reports given to you. Take all of your medications as previously prescribed.    If you have any new, worsened, or concerning symptoms, please return to the emergency department immediately.

## 2021-03-30 NOTE — ED PROVIDER NOTE - OBJECTIVE STATEMENT
40 Y/O female with PMHx of DM presenting to ED c/o heavy vaginal bleeding since 3/8/21, about 3 weeks ago. Pt states bleeding has been intermittent and at its worst requires pt to changer her pad every 2-3 minutes at which the pad was soaked and contained blood clots. Denies abdominal pain, chest pain, SOB, dizziness, syncope, nausea, vomiting, or fevers/chills. Pt says her OBGYN Dr Rand Pina considered D&C but states there was complications and that they would try TXA first which pt has taken with no resolution and persistence of bleeding prompting pt to present here today. 42 Y/O female with PMHx of DM presenting to ED c/o heavy vaginal bleeding since 3/8/21, about 3 weeks ago. Pt states bleeding has been intermittent and at its worst required pt to change her pad every 2-3 minutes at which the pad was soaked and contained blood clots. Denies abdominal pain, chest pain, SOB, dizziness, syncope, nausea, vomiting, or fevers/chills. Pt says her OBGYN Dr Rand Pina considered D&C but states there was complications and that they would try TXA first which pt has taken with no resolution and persistence of bleeding prompting pt to present here today.

## 2021-03-30 NOTE — ED PROVIDER NOTE - PROGRESS NOTE DETAILS
SARAH Garvey: Labs reviewed, H/H stable. TVUS shows thickened endometrium. Pt reassessed, no gross vaginal bleeding. Asymptomatic. Will dc home with f/u with OB/GYN. Strict return precautions. SARAH Garvey: Labs reviewed, H/H stable. TVUS shows thickened endometrium. Pt reassessed, no gross vaginal bleeding. Asymptomatic. Contacted Dr. Burns's office and was informed no after hour coverage unless pt pregnant. Will dc home with f/u with OB/GYN. Strict return precautions.

## 2021-03-30 NOTE — ED PROVIDER NOTE - ATTENDING CONTRIBUTION TO CARE
Dr. Corrigan: 42 yo female with DM on metformin, in ED with vaginal bleeding for 3 weeks.  Has TXA from her OBGYN but continues to have bleeding.  Was told that she needs D&C but states that she has not had yet.  No CP/SOB, N/V/D or abdominal pain.  On exam pt well appearing, in NAD, heart RRR, lungs CTAB, abd NTND, extremities without swelling, strength 5/5 in all extremities and skin without rash.  Pelvic exam as documented. Dr. Corrigan: 42 yo female with DM on metformin, in ED with vaginal bleeding for 3 weeks.  Has TXA from her OBGYN but continues to have bleeding.  Was told that she needs D&C but states that she has not had yet.  No CP/SOB, N/V/D or abdominal pain.  On exam pt well appearing, in NAD, heart RRR, lungs CTAB, abd NTND, extremities without swelling, strength 5/5 in all extremities and skin without rash.  Pelvic exam as documented.  Pt interviewed with [facility]  Paz #[id number].

## 2021-03-30 NOTE — ED ADULT NURSE NOTE - OBJECTIVE STATEMENT
BREAK RN: Pt presents to ED complaining of vaginal bleeding with her period since March 8th. Pt a&ox4, denies any pain or vaginal itching. Denies n/v/d, fevers or chills. 20g IV placed to RAC. Labs obtained as ordered. WIll monitor.

## 2021-04-16 ENCOUNTER — EMERGENCY (EMERGENCY)
Facility: HOSPITAL | Age: 41
LOS: 1 days | Discharge: HOME | End: 2021-04-16
Attending: STUDENT IN AN ORGANIZED HEALTH CARE EDUCATION/TRAINING PROGRAM | Admitting: STUDENT IN AN ORGANIZED HEALTH CARE EDUCATION/TRAINING PROGRAM
Payer: MEDICAID

## 2021-04-16 VITALS
RESPIRATION RATE: 17 BRPM | HEIGHT: 64 IN | SYSTOLIC BLOOD PRESSURE: 133 MMHG | TEMPERATURE: 98 F | DIASTOLIC BLOOD PRESSURE: 89 MMHG | HEART RATE: 71 BPM | OXYGEN SATURATION: 97 %

## 2021-04-16 VITALS
OXYGEN SATURATION: 97 % | SYSTOLIC BLOOD PRESSURE: 140 MMHG | HEART RATE: 86 BPM | DIASTOLIC BLOOD PRESSURE: 90 MMHG | RESPIRATION RATE: 16 BRPM

## 2021-04-16 DIAGNOSIS — N93.9 ABNORMAL UTERINE AND VAGINAL BLEEDING, UNSPECIFIED: ICD-10-CM

## 2021-04-16 LAB
ALBUMIN SERPL ELPH-MCNC: 4.3 G/DL — SIGNIFICANT CHANGE UP (ref 3.3–5)
ALP SERPL-CCNC: 70 U/L — SIGNIFICANT CHANGE UP (ref 40–120)
ALT FLD-CCNC: 22 U/L — SIGNIFICANT CHANGE UP (ref 4–33)
ANION GAP SERPL CALC-SCNC: 15 MMOL/L — HIGH (ref 7–14)
APTT BLD: 26.8 SEC — LOW (ref 27–36.3)
AST SERPL-CCNC: 25 U/L — SIGNIFICANT CHANGE UP (ref 4–32)
BASOPHILS # BLD AUTO: 0.03 K/UL — SIGNIFICANT CHANGE UP (ref 0–0.2)
BASOPHILS NFR BLD AUTO: 0.5 % — SIGNIFICANT CHANGE UP (ref 0–2)
BILIRUB SERPL-MCNC: <0.2 MG/DL — SIGNIFICANT CHANGE UP (ref 0.2–1.2)
BLD GP AB SCN SERPL QL: NEGATIVE — SIGNIFICANT CHANGE UP
BUN SERPL-MCNC: 7 MG/DL — SIGNIFICANT CHANGE UP (ref 7–23)
CALCIUM SERPL-MCNC: 9.3 MG/DL — SIGNIFICANT CHANGE UP (ref 8.4–10.5)
CHLORIDE SERPL-SCNC: 101 MMOL/L — SIGNIFICANT CHANGE UP (ref 98–107)
CO2 SERPL-SCNC: 20 MMOL/L — LOW (ref 22–31)
CREAT SERPL-MCNC: 0.56 MG/DL — SIGNIFICANT CHANGE UP (ref 0.5–1.3)
EOSINOPHIL # BLD AUTO: 0.06 K/UL — SIGNIFICANT CHANGE UP (ref 0–0.5)
EOSINOPHIL NFR BLD AUTO: 0.9 % — SIGNIFICANT CHANGE UP (ref 0–6)
GLUCOSE SERPL-MCNC: 100 MG/DL — HIGH (ref 70–99)
HCG SERPL-ACNC: <5 MIU/ML — SIGNIFICANT CHANGE UP
HCT VFR BLD CALC: 30.4 % — LOW (ref 34.5–45)
HGB BLD-MCNC: 9.2 G/DL — LOW (ref 11.5–15.5)
IANC: 3.45 K/UL — SIGNIFICANT CHANGE UP (ref 1.5–8.5)
IMM GRANULOCYTES NFR BLD AUTO: 0.8 % — SIGNIFICANT CHANGE UP (ref 0–1.5)
INR BLD: 0.93 RATIO — SIGNIFICANT CHANGE UP (ref 0.88–1.16)
LYMPHOCYTES # BLD AUTO: 2.35 K/UL — SIGNIFICANT CHANGE UP (ref 1–3.3)
LYMPHOCYTES # BLD AUTO: 36.2 % — SIGNIFICANT CHANGE UP (ref 13–44)
MCHC RBC-ENTMCNC: 24.6 PG — LOW (ref 27–34)
MCHC RBC-ENTMCNC: 30.3 GM/DL — LOW (ref 32–36)
MCV RBC AUTO: 81.3 FL — SIGNIFICANT CHANGE UP (ref 80–100)
MONOCYTES # BLD AUTO: 0.56 K/UL — SIGNIFICANT CHANGE UP (ref 0–0.9)
MONOCYTES NFR BLD AUTO: 8.6 % — SIGNIFICANT CHANGE UP (ref 2–14)
NEUTROPHILS # BLD AUTO: 3.45 K/UL — SIGNIFICANT CHANGE UP (ref 1.8–7.4)
NEUTROPHILS NFR BLD AUTO: 53 % — SIGNIFICANT CHANGE UP (ref 43–77)
NRBC # BLD: 0 /100 WBCS — SIGNIFICANT CHANGE UP
NRBC # FLD: 0.02 K/UL — HIGH
PLATELET # BLD AUTO: 324 K/UL — SIGNIFICANT CHANGE UP (ref 150–400)
POTASSIUM SERPL-MCNC: 4 MMOL/L — SIGNIFICANT CHANGE UP (ref 3.5–5.3)
POTASSIUM SERPL-SCNC: 4 MMOL/L — SIGNIFICANT CHANGE UP (ref 3.5–5.3)
PROT SERPL-MCNC: 7.3 G/DL — SIGNIFICANT CHANGE UP (ref 6–8.3)
PROTHROM AB SERPL-ACNC: 10.6 SEC — SIGNIFICANT CHANGE UP (ref 10.6–13.6)
RBC # BLD: 3.74 M/UL — LOW (ref 3.8–5.2)
RBC # FLD: 17.2 % — HIGH (ref 10.3–14.5)
RH IG SCN BLD-IMP: POSITIVE — SIGNIFICANT CHANGE UP
SARS-COV-2 RNA SPEC QL NAA+PROBE: SIGNIFICANT CHANGE UP
SODIUM SERPL-SCNC: 136 MMOL/L — SIGNIFICANT CHANGE UP (ref 135–145)
WBC # BLD: 6.5 K/UL — SIGNIFICANT CHANGE UP (ref 3.8–10.5)
WBC # FLD AUTO: 6.5 K/UL — SIGNIFICANT CHANGE UP (ref 3.8–10.5)

## 2021-04-16 PROCEDURE — 93010 ELECTROCARDIOGRAM REPORT: CPT

## 2021-04-16 PROCEDURE — 99291 CRITICAL CARE FIRST HOUR: CPT | Mod: 25

## 2021-04-16 NOTE — ED PROVIDER NOTE - NSFOLLOWUPINSTRUCTIONS_ED_ALL_ED_FT
Follow up with your primary doctor and OBGYN. See Dr. Corado within 1 week. Advance activity as tolerated.  Continue all previously prescribed medications as directed.  Follow up with your primary care physician in 48-72 hours- bring copies of your results.  Return to the ER for worsening or persistent symptoms, and/or ANY NEW OR CONCERNING SYMPTOMS. THIS INCLUDES BUT IS NOT LIMITED TO LIGHTHEADEDNESS, HEAVY BLEEDING OR FOR ANY OTHER SYMPTOMS THAT CONCERN YOU. If you have issues obtaining follow up, please call: [phone] to obtain a doctor or specialist who takes your insurance in your area.  You may call [phone] to make an appointment with the internal medicine clinic.

## 2021-04-16 NOTE — ED PROVIDER NOTE - PROGRESS NOTE DETAILS
SARAH Rodríguez: Paged GYN, awaiting callback. SARAH Rodríguez: Spoke to GYN Resident, states pt can continue TXA, no other changes to regimen.

## 2021-04-16 NOTE — ED ADULT TRIAGE NOTE - NS ED NURSE DIRECT TO ROOM YN
BREANN/DION spoke with pt and son via  ID # [id number] this date  Pt's son also speaks [place]  Pt relates he has changed his PCP so we can meet with him at upcoming appointment  BREANN will assist pt with a [employer] application  Breann has explained application process and the need for a in person evaluation for service up until his 64th birthday  BREANN has also reviewed some basic info re Medicare Advantage Plans a pt has moved from [address] [place] [address] has provided info # re [employer]/ also location of volunteer at the [employer] as well  Son aware some plans provide up to 16 roundtrip per year of taxi type service to medical appointments 
No

## 2021-04-16 NOTE — CONSULT NOTE ADULT - PROBLEM SELECTOR RECOMMENDATION 9
Plan  - VS stable, physical exam benign, patient ambulating and tolerating PO without difficulty  - bhCG <5  - H/H today stable in comparison to labs drawn 3/30/21, coags wnl  - No acute interventions necessary at this time  - Patient to f/u with Dr. Burns in 1 week    D/w Dr. Marilou Pittman, PGY1

## 2021-04-16 NOTE — CONSULT NOTE ADULT - SUBJECTIVE AND OBJECTIVE BOX
SALUD PRETTY  41y  Female [id number]    HPI: 40yo GP LMP 3/8/21 POD#12 s/p D&C presents for chief concern of heavy vaginal bleeding since her procedure. She has been on TXA since 3/8 for heavy vaginal bleeding. Yesterday, she noted her vaginal bleeding increased, and she started requiring 5-6 pads per hour. She also endorses lightheadedness. Denies fevers, chills, chest pain, palpitations, SOB, abdomnial pain, vaginal discharge, urinary or bowel complaints.     Name of GYN Physician: Dr. Burns    POB:     Pgyn: Denies fibroids, cysts, endometriosis, STI's, Abnormal pap smears     Home meds:     Hospital Meds:   MEDICATIONS  (STANDING):    MEDICATIONS  (PRN):      Allergies    No Known Allergies    Intolerances        PAST MEDICAL & SURGICAL HISTORY:  DM2 (diabetes mellitus, type 2)    Anemia    No significant past surgical history        FAMILY HISTORY:  Family history of diabetes mellitus (Father)    Family history of hypertension (Father)        Social History:  Denies smoking use, drug use, alcohol use.   +occasional social alcohol use    Vital Signs Last 24 Hrs  T(C): 36.6 (16 Apr 2021 16:14), Max: 36.7 (16 Apr 2021 14:16)  T(F): 97.9 (16 Apr 2021 16:14), Max: 98 (16 Apr 2021 14:16)  HR: 100 (16 Apr 2021 16:14) (71 - 100)  BP: 133/94 (16 Apr 2021 16:14) (133/89 - 133/94)  BP(mean): --  RR: 20 (16 Apr 2021 16:14) (17 - 20)  SpO2: 97% (16 Apr 2021 16:14) (97% - 97%)    Physical Exam:   General: sitting comftorably in bed, NAD   HEENT: neck supple, full ROM  CV: RR S1S2 no m/r/g  Lungs: CTA b/l, good air flow b/l   Back: No CVA tenderness  Abd: Soft, non-tender, non-distended.  Bowel sounds present.    :  No bleeding on pad.    External labia wnl.  Bimanual exam with no cervical motion tenderness, uterus wnl, adnexa non palpable b/l.  Cervix closed vs. Cervix dilated    cm   Speculum Exam: No active bleeding from os.  Physiologic discharge.    Ext: non-tender b/l, no edema     LABS:                              9.2    6.50  )-----------( 324      ( 16 Apr 2021 17:20 )             30.4           I&O's Detail    PT/INR - ( 16 Apr 2021 17:20 )   PT: 10.6 sec;   INR: 0.93 ratio         PTT - ( 16 Apr 2021 17:20 )  PTT:26.8 sec      RADIOLOGY & ADDITIONAL STUDIES: SALUD PRETTY  41y  Female [id number]    HPI: 40yo  LMP 3/8/21 presents for chief concern of vaginal bleeding. Patient states that she underwent D&C on 21 2/2 heavy vaginal bleeding. For 2 days after her procedure, she noticed resolution of vaginal bleeding, but subsequently she started bleeding again, noting that she is now soaking through 8-9 pads daily. Patient has been on TXA since 3/8/21 to further manage her symptoms. She also endorses lightheadedness, although she notes that she has not been drinking large amounts of fluids today. Denies fevers, chills, chest pain, palpitations, SOB, abdominal pain, vaginal discharge, urinary or bowel complaints. She is tolerating PO, ambulating without difficulty, and voiding spontaneously.     Name of GYN Physician: Dr. Burns    POB: 2 NSVDs, 2 D&Cs    Pgyn: Denies fibroids, cysts, endometriosis, STI's, Abnormal pap smears     Home meds: TXA, Metformin    Allergies  No Known Allergies    PAST MEDICAL & SURGICAL HISTORY:  DM2 (diabetes mellitus, type 2) on Metformin  Anemia  D&C x2      FAMILY HISTORY:  Family history of diabetes mellitus (Father)    Family history of hypertension (Father)    Social History:  Denies smoking use, drug use, alcohol use.   +occasional social alcohol use    Vital Signs Last 24 Hrs  T(C): 36.6 (2021 16:14), Max: 36.7 (2021 14:16)  T(F): 97.9 (2021 16:14), Max: 98 (2021 14:16)  HR: 100 (2021 16:14) (71 - 100)  BP: 133/94 (2021 16:14) (133/89 - 133/94)  RR: 20 (2021 16:14) (17 - 20)  SpO2: 97% (2021 16:14) (97% - 97%)    Physical Exam:   General: sitting comfortably in bed, NAD   CV: RR S1S2 no m/r/g  Lungs: CTA b/l, good air flow b/l   Back: No CVA tenderness  Abd: Soft, non-tender, non-distended.    : No bleeding on pad. External labia wnl. Bimanual exam with no cervical motion tenderness, uterus wnl, adnexa non palpable b/l.  Cervix closed.  Speculum Exam: Minimal spotting noted in the vaginal vault. No active bleeding from os.   Ext: non-tender b/l, no edema     LABS:             9.2    6.50  )-----------( 324      ( 2021 17:20 )             30.4     PT/INR - ( 2021 17:20 )   PT: 10.6 sec;   INR: 0.93 ratio         PTT - ( 2021 17:20 )  PTT:26.8 sec

## 2021-04-16 NOTE — ED PROVIDER NOTE - ATTENDING CONTRIBUTION TO CARE
[id number]  42 yo female presents to ED for evaluation of vaginal bleeding. Pt reports she began having very heavy vaginal bleeding on 3/8, was evaluated by gyn, RXed tranexamic acid and recommended to have D&C. D&C procedure on 4/4, states did not have much bleeding afterward until 4/13. Was seen at gyn office, had repeat ultrasound and recommended to continue TXA. Pt reports she is feeling generalized weakness and is beginning to have lightheadedness. Denies shortness of breath, chest pain, palpitations  Gen: no acute distress, mild tachypnea  Cardiac: regular rate and rhythm, +S1S2  Pulm: Clear to auscultation bilaterally  Abd: soft, nontender, nondistended, no guarding  A/P r/o symptomatic anemia - labs, poss transfusion, consult ob, ekg, reassess  [id number]  40 yo female presents to ED for evaluation of vaginal bleeding. Pt reports she began having very heavy vaginal bleeding on 3/8, was evaluated by gyn, RXed tranexamic acid and recommended to have D&C. D&C procedure on 4/4, states did not have much bleeding afterward until 4/13. Was seen at gyn office, had repeat ultrasound and recommended to continue TXA. Pt reports she is feeling generalized weakness and is beginning to have lightheadedness. Denies shortness of breath, chest pain, palpitations  Gen: no acute distress, mild tachypnea  Cardiac: regular rate and rhythm, +S1S2  Pulm: Clear to auscultation bilaterally  Abd: soft, nontender, nondistended, no guarding  A/P r/o symptomatic anemia - labs, poss transfusion, consult ob, ekg, reassess    Critical care time 45 minutes

## 2021-04-16 NOTE — ED ADULT NURSE NOTE - OBJECTIVE STATEMENT
Pt awake, alert and oriented x 4 c/o pelvic pain, vaginal bleeding, headache since having D and C last month for unknown reason as per patient.   Pt denies chest pain or shortness of breath but grunting noted with respirations.   No fever.   placed on cardiac monitor and pulse ox with VSS and NSR noted.   Pt maintaining spo2 95% and greater on room air.  pale and weak in appearance.   Denies n/v/d.   IV placed and blood work sent.   Awaiting further plan of care.

## 2021-04-16 NOTE — CONSULT NOTE ADULT - ASSESSMENT
Assessment: 42yo  LMP 3/8/21 presents on POD#12 s/p D&C, for evaluation of vaginal bleeding. On initial presentation, patient was tachycardic to low 100s, however, HR improved to 80s without intervention, BP wnl. Patient stable and symptomatically improving.

## 2021-04-16 NOTE — ED PROVIDER NOTE - CLINICAL SUMMARY MEDICAL DECISION MAKING FREE TEXT BOX
Spoke to pt with  [id number]. 40 Y/O F C/O vaginal bleeding since her LMP on 3/8. Pt states she had a D+ C on 4/4 for "heavy bleeding." Pt states that she has been on TXA since 3/8 which she stopped on 4/4 pre-procedure. Plan is labs to eval for anemia or electrolyte disturbance, possible blood transfusion. + Mild pooling in vault.

## 2021-04-16 NOTE — ED ADULT NURSE REASSESSMENT NOTE - NS ED NURSE REASSESS COMMENT FT1
Pt awake, alert and oriented x 4 c/o persistent abdominal/pelvic pain with vaginal bleeding.   Independently ambulatory to bathroom to void.   Respirations even and unlabored improved from earlier.  VSS.   NSR.   Pt remains pale in appearance and c/o persistent weakness.   awaiting dispo.

## 2021-04-16 NOTE — ED PROVIDER NOTE - OBJECTIVE STATEMENT
Spoke to pt with  [id number]. 42 Y/O F C/O vaginal bleeding since her LMP on 3/8. Pt states she had a D+ C on 4/4 for "heavy bleeding." Pt states that she has been on TXA since 3/8 which she stopped on 4/4 pre-procedure. States that she resumed it 2 days after the procedure. Pt states that yesterday she has been bleeding through 5-6 pads per hour and today she states she felt lightheaded. Pt states she sees María Burns. Pt denies any other sx or acute complaints.

## 2021-04-20 PROBLEM — D64.9 ANEMIA, UNSPECIFIED: Chronic | Status: ACTIVE | Noted: 2021-04-16

## 2021-05-04 PROBLEM — Z00.00 ENCOUNTER FOR PREVENTIVE HEALTH EXAMINATION: Status: ACTIVE | Noted: 2021-05-04

## 2021-05-05 ENCOUNTER — OUTPATIENT (OUTPATIENT)
Dept: OUTPATIENT SERVICES | Facility: HOSPITAL | Age: 41
LOS: 1 days | End: 2021-05-05

## 2021-05-05 ENCOUNTER — APPOINTMENT (OUTPATIENT)
Dept: OBGYN | Facility: HOSPITAL | Age: 41
End: 2021-05-05
Payer: MEDICAID

## 2021-05-05 VITALS
HEART RATE: 96 BPM | SYSTOLIC BLOOD PRESSURE: 111 MMHG | DIASTOLIC BLOOD PRESSURE: 73 MMHG | HEIGHT: 62 IN | WEIGHT: 138 LBS | BODY MASS INDEX: 25.4 KG/M2 | TEMPERATURE: 98.3 F

## 2021-05-05 DIAGNOSIS — N93.9 ABNORMAL UTERINE AND VAGINAL BLEEDING, UNSPECIFIED: ICD-10-CM

## 2021-05-05 DIAGNOSIS — Z86.39 PERSONAL HISTORY OF OTHER ENDOCRINE, NUTRITIONAL AND METABOLIC DISEASE: ICD-10-CM

## 2021-05-05 DIAGNOSIS — Z78.9 OTHER SPECIFIED HEALTH STATUS: ICD-10-CM

## 2021-05-05 PROCEDURE — ZZZZZ: CPT | Mod: GE

## 2021-05-05 RX ORDER — METFORMIN HYDROCHLORIDE 625 MG/1
TABLET ORAL
Refills: 0 | Status: ACTIVE | COMMUNITY

## 2021-05-07 DIAGNOSIS — N93.9 ABNORMAL UTERINE AND VAGINAL BLEEDING, UNSPECIFIED: ICD-10-CM

## 2021-05-09 NOTE — PHYSICAL EXAM
[Appropriately responsive] : appropriately responsive [Alert] : alert [No Acute Distress] : no acute distress [No Lymphadenopathy] : no lymphadenopathy [Regular Rate Rhythm] : regular rate rhythm [No Murmurs] : no murmurs [Clear to Auscultation B/L] : clear to auscultation bilaterally [Soft] : soft [Non-tender] : non-tender [Non-distended] : non-distended [No HSM] : No HSM [No Lesions] : no lesions [No Mass] : no mass [Oriented x3] : oriented x3 [Examination Of The Breasts] : a normal appearance [No Masses] : no breast masses were palpable [Labia Majora] : normal [Labia Minora] : normal [Normal] : normal [Uterine Adnexae] : normal [FreeTextEntry4] : Minimal dark blood in vault [FreeTextEntry5] : No active bleeding from cervix

## 2021-07-20 ENCOUNTER — APPOINTMENT (OUTPATIENT)
Dept: OBGYN | Facility: HOSPITAL | Age: 41
End: 2021-07-20

## 2022-08-11 ENCOUNTER — EMERGENCY (EMERGENCY)
Facility: HOSPITAL | Age: 42
LOS: 1 days | Discharge: HOME | End: 2022-08-11
Attending: EMERGENCY MEDICINE | Admitting: EMERGENCY MEDICINE

## 2022-08-11 VITALS
HEART RATE: 82 BPM | DIASTOLIC BLOOD PRESSURE: 82 MMHG | RESPIRATION RATE: 16 BRPM | OXYGEN SATURATION: 100 % | SYSTOLIC BLOOD PRESSURE: 109 MMHG | TEMPERATURE: 98 F

## 2022-08-11 VITALS
RESPIRATION RATE: 17 BRPM | SYSTOLIC BLOOD PRESSURE: 111 MMHG | DIASTOLIC BLOOD PRESSURE: 75 MMHG | HEART RATE: 90 BPM | OXYGEN SATURATION: 100 % | TEMPERATURE: 98 F | HEIGHT: 64 IN

## 2022-08-11 PROCEDURE — 70360 X-RAY EXAM OF NECK: CPT | Mod: 26

## 2022-08-11 PROCEDURE — 99284 EMERGENCY DEPT VISIT MOD MDM: CPT

## 2022-08-11 PROCEDURE — 71045 X-RAY EXAM CHEST 1 VIEW: CPT | Mod: 26

## 2022-08-11 NOTE — ED PROVIDER NOTE - PATIENT PORTAL LINK FT
You can access the FollowMyHealth Patient Portal offered by [facility] by registering at the following website: [email or web address]. By joining [employer]’s FollowMyHealth portal, you will also be able to view your health information using other applications (apps) compatible with our system.

## 2022-08-11 NOTE — ED PROVIDER NOTE - CLINICAL SUMMARY MEDICAL DECISION MAKING FREE TEXT BOX
42 year old female with sore throat for 2 months. finished 5 day abx course which made no difference. Considered bacterial cause but less likely based on negative fever, chills, swelling. Less likely viral given lack of congestion, fever, feeling unwell. Her story and lack of other symptoms are consistent with trauma from fish bone. Likely DC with advice to use tylenol for pain and follow up with ENT.

## 2022-08-11 NOTE — ED PROVIDER NOTE - NSFOLLOWUPINSTRUCTIONS_ED_ALL_ED_FT
You were seen in the ED for your sore throat. We did xrays of your chest and your neck which did not raise any concerns. Please follow up with an ENT doctor. Take tylenol and/or ibuprofen for your pain.    Sore Throat      A sore throat is pain, burning, irritation, or scratchiness in the throat. When you have a sore throat, you may feel pain or tenderness in your throat when you swallow or talk.    Many things can cause a sore throat, including:  •An infection.      •Seasonal allergies.      •Dryness in the air.      •Irritants, such as smoke or pollution.      •Radiation treatment for cancer.      •Gastroesophageal reflux disease (GERD).      •A foreign body      A sore throat is often the first sign of another sickness. It may happen with other symptoms, such as coughing, sneezing, fever, and swollen neck glands. Most sore throats go away without medical treatment.      Follow these instructions at home:      Juice, water, and tea.        A do not smoke cigarettes sign.      Medicines     •Take over-the-counter and prescription medicines only as told by your health care provider.      •Children often get sore throats. Do not give your child aspirin because of the association with Reye's syndrome.      •Use throat sprays to soothe your throat as told by your health care provider.      Managing pain     To help with pain, try:  •Sipping warm liquids, such as broth, herbal tea, or warm water.      •Eating or drinking cold or frozen liquids, such as frozen ice pops.      •Gargling with a mixture of salt and water 3–4 times a day or as needed. To make salt water, completely dissolve ½–1 tsp (3–6 g) of salt in 1 cup (237 mL) of warm water.      •Sucking on hard candy or throat lozenges.      •Putting a cool-mist humidifier in your bedroom at night to moisten the air.      •Sitting in the bathroom with the door closed for 5–10 minutes while you run hot water in the shower.      General instructions    •Do not use any products that contain nicotine or tobacco. These products include cigarettes, chewing tobacco, and vaping devices, such as e-cigarettes. If you need help quitting, ask your health care provider.      •Rest as needed.      •Drink enough fluid to keep your urine pale yellow.      •Wash your hands often with soap and water for at least 20 seconds. If soap and water are not available, use hand .        Contact a health care provider if:    •You have a fever for more than 2–3 days.      •You have symptoms that last for more than 2–3 days.      •Your throat does not get better within 7 days.      •You have a fever and your symptoms suddenly get worse.        Get help right away if:    •You have difficulty breathing.      •You cannot swallow fluids, soft foods, or your saliva.      •You have increased swelling in your throat or neck.      •You have persistent nausea and vomiting.      These symptoms may represent a serious problem that is an emergency. Do not wait to see if the symptoms will go away. Get medical help right away. Call your local emergency services (911 in the [place] Do not drive yourself to the hospital.       Summary    •A sore throat is pain, burning, irritation, or scratchiness in the throat. Many things can cause a sore throat.      •Take over-the-counter medicines only as told by your health care provider.      •Rest as needed.      •Drink enough fluid to keep your urine pale yellow.      •Contact a health care provider if your throat does not get better within 7 days.      This information is not intended to replace advice given to you by your health care provider. Make sure you discuss any questions you have with your health care provider.

## 2022-08-11 NOTE — ED PROVIDER NOTE - OBJECTIVE STATEMENT
42 year old female with hx of DM comes in with 2 months of sore throat and a horse voice. She states about 2 months ago she was eating fish when she felt a fish bone scratch her throat. She no longer feels the bone in her throat but continues to have some pain. She also has some mild pain in her ears, more on the right than left. Seen by PCP 15 days ago and finished a 5 day course of abx. No fevers, chills, dysphagia,

## 2022-08-11 NOTE — ED PROVIDER NOTE - NSICDXFAMILYHX_GEN_ALL_CORE_FT
FAMILY HISTORY:  Father  Still living? Unknown  Family history of diabetes mellitus, Age at diagnosis: Age Unknown  Family history of hypertension, Age at diagnosis: Age Unknown

## 2022-08-11 NOTE — ED PROVIDER NOTE - NSFOLLOWUPCLINICS_GEN_ALL_ED_FT
An ENT Doctor  Otolaryngology (ENT)  .  [place]   Phone:   Fax:     [facility] - ENT  Otolaryngology (ENT)  [address] [place] [address]  Phone: [phone]  Fax:     [facility]  Otolaryngology (ENT)  [address] [place] [address] - [address] [place] [address]  Phone: [phone]  Fax:

## 2022-08-11 NOTE — ED PROVIDER NOTE - ATTENDING CONTRIBUTION TO CARE
agree with resident note    "42 year old female with hx of DM comes in with 2 months of sore throat and a horse voice. She states about 2 months ago she was eating fish when she felt a fish bone scratch her throat. She no longer feels the bone in her throat but continues to have some pain. She also has some mild pain in her ears, more on the right than left. Seen by PCP 15 days ago and finished a 5 day course of abx. No fevers, chills, dysphagia,"    able to tolerate saliva and PO; states spicy foods cause more reflux type symptoms.  Denies stridor.  States voice somewhat more hoarse but phonation and sound of voice wnl.      PE: well appearing; no resp distress; VSS; HEENT: no oropharyngeal edema, no trismus; uvula midline; CTAB/L; s1 s2 no m/r/g    Imp: possible mucosal irritation from large bone vs small bone and reabsoprtion; no emergent indication for ENT; will have pt follow up with ENT; will get CXR and neck xray to r/o pneumomediastinum

## 2022-08-11 NOTE — ED ADULT NURSE NOTE - OBJECTIVE STATEMENT
received pt in zone 11, 42 yr/o female A+Ox4, ambulatory at baseline. PMH of DM II. presented to the ED C/O soar throat and horse voice for 2 months s/p eating fish and experience a bone scratch her throat. states she still feels scratch in her throat and pain. no s/s of respiratory distress noted. VSS, denies chest pain and SOB, RR even and unlabored. no need for supplemental O2 at this time. will continue to monitor.

## 2022-08-17 NOTE — ED PROVIDER NOTE - PHYSICAL EXAMINATION
17-Aug-2022 19:55
***GEN - NAD; well appearing; A+O x3 ***HEAD - NC/AT   ***PULMONARY - CTA b/l, symmetric breath sounds. ***CARDIAC -s1s2, RRR, no M,G,R  ***ABDOMEN - ND, NT, soft, no guarding, no rebound, no alexandra's   ***SKIN - no rash or bruising   ***NEUROLOGIC - alert and oriented, follows commands, sensation nl, motor nl, ***PSYCH - insight and judgment nl, memory nl, affect nl, thought nl    R sided mandibular TTP, unable to fully open mouth  edema to R mandible - no obvious collection

## 2023-06-23 NOTE — ED ADULT NURSE NOTE - TEMPLATE LIST FOR HEAD TO TOE ASSESSMENT
What Type Of Note Output Would You Prefer (Optional)?: Standard Output
How Severe Is Your Rash?: mild
Is This A New Presentation, Or A Follow-Up?: Rash
General

## 2023-08-08 NOTE — ED PROVIDER NOTE - PATIENT PORTAL LINK FT
AUTHORIZATION FOR RELEASE OF   CONFIDENTIAL INFORMATION        We are seeing Nino Arreguin, date of birth 1981, in the clinic at [facility]. Lena Go MD is the patient's PCP. Nino Arreguin has an outstanding lab/procedure at the time we reviewed his chart. In order to help keep his health information updated, he has authorized us to request the following medical record(s):        (  )  MAMMOGRAM                                      (  )  COLONOSCOPY      (  )  PAP SMEAR                                          (  )  OUTSIDE LAB RESULTS     (  )  DEXA SCAN                                          ( X )  DIABETIC EYE EXAM            (  )  FOOT EXAM                                          (  )  ENTIRE RECORD     (  )  OUTSIDE IMMUNIZATIONS                 (  )  _______________         Please fax records to [facility], [address],-[phone]     If you have any questions, please contact Patricia Verduzco           Patient Name: Nino Arreguin  : 1981  Patient Phone #: [phone]     
You can access the FollowMyHealth Patient Portal offered by [facility] by registering at the following website: [email or web address]. By joining [employer]’s FollowMyHealth portal, you will also be able to view your health information using other applications (apps) compatible with our system.

## 2023-12-06 NOTE — ED PROVIDER NOTE - CONDITION AT DISCHARGE:
Detail Level: Zone
Detail Level: Generalized
Detail Level: Detailed
Detail Level: Simple
Satisfactory

## 2024-03-25 ENCOUNTER — EMERGENCY (EMERGENCY)
Facility: HOSPITAL | Age: 44
LOS: 1 days | Discharge: HOME | End: 2024-03-25
Admitting: EMERGENCY MEDICINE
Payer: MEDICAID

## 2024-03-25 VITALS
OXYGEN SATURATION: 98 % | DIASTOLIC BLOOD PRESSURE: 84 MMHG | SYSTOLIC BLOOD PRESSURE: 125 MMHG | RESPIRATION RATE: 18 BRPM | HEART RATE: 89 BPM | TEMPERATURE: 98 F

## 2024-03-25 PROCEDURE — 99284 EMERGENCY DEPT VISIT MOD MDM: CPT

## 2024-03-25 RX ORDER — IBUPROFEN 200 MG
600 TABLET ORAL ONCE
Refills: 0 | Status: COMPLETED | OUTPATIENT
Start: 2024-03-25 | End: 2024-03-25

## 2024-03-25 RX ADMIN — Medication 600 MILLIGRAM(S): at 20:03

## 2024-03-25 RX ADMIN — Medication 600 MILLIGRAM(S): at 19:33

## 2024-03-25 NOTE — ED ADULT NURSE NOTE - OBJECTIVE STATEMENT
Patient is a 45 yo female, phx DM2, anemia, presenting with R 5th finger pain x 1 week. A&Ox4, no signs of distress, denies fever, finger mildly red and swollen, skin intact. Medicated for pain per orders. Pending x-ray. Fall precautions maintained.

## 2024-03-25 NOTE — ED PROVIDER NOTE - CLINICAL SUMMARY MEDICAL DECISION MAKING FREE TEXT BOX
45 y/o female with pmhx of DM on metformin, presents to ED c/o right 5th digit pain. Pt states she had redness and swelling surrounding her nail bed last week and saw her PMD. Given Keflex she completed for the week. States finger redness and swelling resolved. Came to ER because her skin looked darker to her and was concerned because she has diabetes. Pain has improved. Taking Tylenol with relief. exam unremarkable, no swelling or skin changes. no redness or signs of infection. plan to check XR, provide motrin and dc refer to hand surgery. pt has picture with her from last week that appears to be a paronychia, clinically resolved today.

## 2024-03-25 NOTE — ED PROVIDER NOTE - NSFOLLOWUPINSTRUCTIONS_ED_ALL_ED_FT
Follow up with your PMD within 48-72 hours.    Rest. Take Motrin 600mg every 8hrs with food for pain.      Worsening, continued or new concerning symptoms, fever, swelling, redness, return to the emergency department.

## 2024-03-25 NOTE — ED PROVIDER NOTE - MUSCULOSKELETAL, MLM
Spine appears normal, range of motion is not limited, no muscle or joint tenderness. No bony ttp. No redness, no swelling, no bruising. No skin discoloration. Radial pulse 2+ full rom.

## 2024-03-25 NOTE — ED PROVIDER NOTE - PATIENT PORTAL LINK FT
You can access the FollowMyHealth Patient Portal offered by [facility] by registering at the following website: [email or web address]. By joining [employer]’s FollowMyHealth portal, you will also be able to view your health information using other applications (apps) compatible with our system.

## 2024-03-25 NOTE — ED ADULT TRIAGE NOTE - HEART RATE (BEATS/MIN)
HISTORY OF PRESENT ILLNESS     Bonilla morrison 1 month ago, noticed redness and pain at umbilical port incision site a few days ago, progressively worsened, now with foul odor, some liquid discharge, reports seeing a small string as well      PAST MEDICAL, FAMILY AND SOCIAL HISTORY     The following histories were personally reviewed and updated.  Current medications, Allergies, Problem list and Past Medical History    REVIEW OF SYSTEMS     Review of Systems   Constitutional: Negative for chills and fever.   Gastrointestinal: Negative for abdominal pain, nausea and vomiting.   Skin: Positive for color change.       PHYSICAL EXAM     Physical Exam   Constitutional: She appears well-developed and well-nourished.   Abdominal:   Umbilicus: incisional scar with surrounding erythema and tenderness, small suture material protruding from wound, +malodorous   Neurological: She is alert. She exhibits normal muscle tone.   Skin: Skin is warm and dry.       ASSESSMENT/PLAN     Surgical Incision Irritation / Infection  - Trimmed edge of suture material protruding  - Wash and dry daily  - Apply topical abx ointment  - Bactrim DS BID x7 days  - Followup with Gen Surgery next week if not resolved    Peewee Lopez MD    
CC:  Aminta Araujo is here today for belly button discharge and pain.  Pt. Also informed me that the discharge has a bad smell to it.  Pt. Has gallbladder surgery on 5-.  No fever or chills.      Medications: medications verified and updated  Refills needed today?  NO  denies Latex allergy or sensitivity.          
89

## 2024-03-25 NOTE — ED ADULT NURSE NOTE - NSFALLUNIVINTERV_ED_ALL_ED
Bed/Stretcher in lowest position, wheels locked, appropriate side rails in place/Call bell, personal items and telephone in reach/Instruct patient to call for assistance before getting out of bed/chair/stretcher/Non-slip footwear applied when patient is off stretcher/[place] to call system/Physically safe environment - no spills, clutter or unnecessary equipment/Purposeful proactive rounding/Room/bathroom lighting operational, light cord in reach

## 2024-03-25 NOTE — ED PROVIDER NOTE - PROGRESS NOTE DETAILS
SARAH Calzada- pt does not want to stay for hand XR. aware of risks of leaving. has emergency at home. will dc. given return precautions.

## 2024-03-25 NOTE — ED PROVIDER NOTE - OBJECTIVE STATEMENT
45 y/o female with pmhx of DM on metformin, presents to ED c/o right 5th digit pain. Pt states she had redness and swelling surrounding her nail bed last week and saw her PMD. Given Keflex she completed for the week. States finger redness and swelling resolved. Came to ER because her skin looked darker to her and was concerned because she has diabetes. Pain has improved. Taking Tylenol with relief. No fevers, chills, weakness, numbness, tingling.

## 2024-03-27 ENCOUNTER — EMERGENCY (EMERGENCY)
Facility: HOSPITAL | Age: 44
LOS: 1 days | Discharge: HOME | End: 2024-03-27
Attending: EMERGENCY MEDICINE | Admitting: EMERGENCY MEDICINE
Payer: MEDICAID

## 2024-03-27 VITALS
HEART RATE: 103 BPM | TEMPERATURE: 98 F | OXYGEN SATURATION: 100 % | SYSTOLIC BLOOD PRESSURE: 126 MMHG | DIASTOLIC BLOOD PRESSURE: 86 MMHG | RESPIRATION RATE: 18 BRPM

## 2024-03-27 PROCEDURE — 99285 EMERGENCY DEPT VISIT HI MDM: CPT | Mod: 25

## 2024-03-27 NOTE — ED ADULT TRIAGE NOTE - CHIEF COMPLAINT QUOTE
Pt st" I have pinky pain for 13 days was seen here 2 days ago with pain with some redness by the nail bed and  now very swollen and painful she was on antibiotics finished Monday. Finger so swollen she can not bend finger and she  feels throbbing pain traveling up arm ." + swelling warmth to mid finger rt 5th finger, no obvious warm redness noted tracking up arm.. HX DM2 Pt Tajik speaking pt request daughter Hermann  for translation as per daughter .  pt st" I have pinky pain for 13 days was seen here 2 days ago with pain with some redness by the nail bed and  now very swollen and painful  was on antibiotics finished Monday. Finger so swollen  can not bend finger and she  feels throbbing pain traveling up arm ." + swelling warmth to mid finger rt 5th finger, no obvious warm redness noted tracking up arm.. HX DM2

## 2024-03-28 VITALS
DIASTOLIC BLOOD PRESSURE: 79 MMHG | RESPIRATION RATE: 18 BRPM | TEMPERATURE: 98 F | HEART RATE: 106 BPM | SYSTOLIC BLOOD PRESSURE: 117 MMHG | OXYGEN SATURATION: 100 %

## 2024-03-28 LAB
ADD ON TEST-SPECIMEN IN LAB: SIGNIFICANT CHANGE UP
ADD ON TEST-SPECIMEN IN LAB: SIGNIFICANT CHANGE UP
ALBUMIN SERPL ELPH-MCNC: 4 G/DL — SIGNIFICANT CHANGE UP (ref 3.3–5)
ALP SERPL-CCNC: 74 U/L — SIGNIFICANT CHANGE UP (ref 40–120)
ALT FLD-CCNC: 16 U/L — SIGNIFICANT CHANGE UP (ref 4–33)
ANION GAP SERPL CALC-SCNC: 13 MMOL/L — SIGNIFICANT CHANGE UP (ref 7–14)
AST SERPL-CCNC: 19 U/L — SIGNIFICANT CHANGE UP (ref 4–32)
BASE EXCESS BLDV CALC-SCNC: -2 MMOL/L — SIGNIFICANT CHANGE UP (ref -2–3)
BASOPHILS # BLD AUTO: 0.03 K/UL — SIGNIFICANT CHANGE UP (ref 0–0.2)
BASOPHILS NFR BLD AUTO: 0.6 % — SIGNIFICANT CHANGE UP (ref 0–2)
BILIRUB SERPL-MCNC: <0.2 MG/DL — SIGNIFICANT CHANGE UP (ref 0.2–1.2)
BLOOD GAS VENOUS COMPREHENSIVE RESULT: SIGNIFICANT CHANGE UP
BUN SERPL-MCNC: 10 MG/DL — SIGNIFICANT CHANGE UP (ref 7–23)
CALCIUM SERPL-MCNC: 8.9 MG/DL — SIGNIFICANT CHANGE UP (ref 8.4–10.5)
CHLORIDE BLDV-SCNC: 105 MMOL/L — SIGNIFICANT CHANGE UP (ref 96–108)
CHLORIDE SERPL-SCNC: 105 MMOL/L — SIGNIFICANT CHANGE UP (ref 98–107)
CO2 BLDV-SCNC: 25.1 MMOL/L — SIGNIFICANT CHANGE UP (ref 22–26)
CO2 SERPL-SCNC: 22 MMOL/L — SIGNIFICANT CHANGE UP (ref 22–31)
CREAT SERPL-MCNC: 0.6 MG/DL — SIGNIFICANT CHANGE UP (ref 0.5–1.3)
CRP SERPL-MCNC: <3 MG/L — SIGNIFICANT CHANGE UP
EGFR: 113 ML/MIN/1.73M2 — SIGNIFICANT CHANGE UP
EOSINOPHIL # BLD AUTO: 0.1 K/UL — SIGNIFICANT CHANGE UP (ref 0–0.5)
EOSINOPHIL NFR BLD AUTO: 1.9 % — SIGNIFICANT CHANGE UP (ref 0–6)
ERYTHROCYTE [SEDIMENTATION RATE] IN BLOOD: 16 MM/HR — SIGNIFICANT CHANGE UP (ref 4–25)
GAS PNL BLDV: 137 MMOL/L — SIGNIFICANT CHANGE UP (ref 136–145)
GLUCOSE BLDV-MCNC: 151 MG/DL — HIGH (ref 70–99)
GLUCOSE SERPL-MCNC: 151 MG/DL — HIGH (ref 70–99)
HCG SERPL-ACNC: <1 MIU/ML — SIGNIFICANT CHANGE UP
HCO3 BLDV-SCNC: 24 MMOL/L — SIGNIFICANT CHANGE UP (ref 22–29)
HCT VFR BLD CALC: 26.7 % — LOW (ref 34.5–45)
HCT VFR BLDA CALC: 24 % — LOW (ref 34.5–46.5)
HGB BLD CALC-MCNC: 7.9 G/DL — LOW (ref 11.7–16.1)
HGB BLD-MCNC: 7.7 G/DL — LOW (ref 11.5–15.5)
IANC: 2.97 K/UL — SIGNIFICANT CHANGE UP (ref 1.8–7.4)
IMM GRANULOCYTES NFR BLD AUTO: 0.4 % — SIGNIFICANT CHANGE UP (ref 0–0.9)
LACTATE BLDV-MCNC: 1.3 MMOL/L — SIGNIFICANT CHANGE UP (ref 0.5–2)
LYMPHOCYTES # BLD AUTO: 1.76 K/UL — SIGNIFICANT CHANGE UP (ref 1–3.3)
LYMPHOCYTES # BLD AUTO: 32.6 % — SIGNIFICANT CHANGE UP (ref 13–44)
MCHC RBC-ENTMCNC: 19.6 PG — LOW (ref 27–34)
MCHC RBC-ENTMCNC: 28.8 GM/DL — LOW (ref 32–36)
MCV RBC AUTO: 68.1 FL — LOW (ref 80–100)
MONOCYTES # BLD AUTO: 0.52 K/UL — SIGNIFICANT CHANGE UP (ref 0–0.9)
MONOCYTES NFR BLD AUTO: 9.6 % — SIGNIFICANT CHANGE UP (ref 2–14)
NEUTROPHILS # BLD AUTO: 2.97 K/UL — SIGNIFICANT CHANGE UP (ref 1.8–7.4)
NEUTROPHILS NFR BLD AUTO: 54.9 % — SIGNIFICANT CHANGE UP (ref 43–77)
NRBC # BLD: 0 /100 WBCS — SIGNIFICANT CHANGE UP (ref 0–0)
NRBC # FLD: 0 K/UL — SIGNIFICANT CHANGE UP (ref 0–0)
PCO2 BLDV: 44 MMHG — SIGNIFICANT CHANGE UP (ref 39–52)
PH BLDV: 7.34 — SIGNIFICANT CHANGE UP (ref 7.32–7.43)
PLATELET # BLD AUTO: 420 K/UL — HIGH (ref 150–400)
PO2 BLDV: 28 MMHG — SIGNIFICANT CHANGE UP (ref 25–45)
POTASSIUM BLDV-SCNC: 3.5 MMOL/L — SIGNIFICANT CHANGE UP (ref 3.5–5.1)
POTASSIUM SERPL-MCNC: 3.7 MMOL/L — SIGNIFICANT CHANGE UP (ref 3.5–5.3)
POTASSIUM SERPL-SCNC: 3.7 MMOL/L — SIGNIFICANT CHANGE UP (ref 3.5–5.3)
PROT SERPL-MCNC: 7.4 G/DL — SIGNIFICANT CHANGE UP (ref 6–8.3)
RBC # BLD: 3.92 M/UL — SIGNIFICANT CHANGE UP (ref 3.8–5.2)
RBC # FLD: 19.9 % — HIGH (ref 10.3–14.5)
SAO2 % BLDV: 31.1 % — LOW (ref 67–88)
SODIUM SERPL-SCNC: 140 MMOL/L — SIGNIFICANT CHANGE UP (ref 135–145)
WBC # BLD: 5.4 K/UL — SIGNIFICANT CHANGE UP (ref 3.8–10.5)
WBC # FLD AUTO: 5.4 K/UL — SIGNIFICANT CHANGE UP (ref 3.8–10.5)

## 2024-03-28 PROCEDURE — 73201 CT UPPER EXTREMITY W/DYE: CPT | Mod: 26,RT,MC

## 2024-03-28 PROCEDURE — 99236 HOSP IP/OBS SAME DATE HI 85: CPT | Mod: 25

## 2024-03-28 PROCEDURE — 73140 X-RAY EXAM OF FINGER(S): CPT | Mod: 26,RT

## 2024-03-28 RX ORDER — KETOROLAC TROMETHAMINE 30 MG/ML
30 SYRINGE (ML) INJECTION ONCE
Refills: 0 | Status: DISCONTINUED | OUTPATIENT
Start: 2024-03-28 | End: 2024-03-28

## 2024-03-28 RX ORDER — SODIUM CHLORIDE 9 MG/ML
1000 INJECTION INTRAMUSCULAR; INTRAVENOUS; SUBCUTANEOUS ONCE
Refills: 0 | Status: COMPLETED | OUTPATIENT
Start: 2024-03-28 | End: 2024-03-28

## 2024-03-28 RX ORDER — DEXTROSE 50 % IN WATER 50 %
25 SYRINGE (ML) INTRAVENOUS ONCE
Refills: 0 | Status: DISCONTINUED | OUTPATIENT
Start: 2024-03-28 | End: 2024-03-31

## 2024-03-28 RX ORDER — INSULIN LISPRO 100/ML
VIAL (ML) SUBCUTANEOUS
Refills: 0 | Status: DISCONTINUED | OUTPATIENT
Start: 2024-03-28 | End: 2024-03-31

## 2024-03-28 RX ORDER — GLUCAGON INJECTION, SOLUTION 0.5 MG/.1ML
1 INJECTION, SOLUTION SUBCUTANEOUS ONCE
Refills: 0 | Status: DISCONTINUED | OUTPATIENT
Start: 2024-03-28 | End: 2024-03-31

## 2024-03-28 RX ORDER — SODIUM CHLORIDE 9 MG/ML
1000 INJECTION, SOLUTION INTRAVENOUS
Refills: 0 | Status: DISCONTINUED | OUTPATIENT
Start: 2024-03-28 | End: 2024-03-31

## 2024-03-28 RX ORDER — KETOROLAC TROMETHAMINE 30 MG/ML
15 SYRINGE (ML) INJECTION ONCE
Refills: 0 | Status: DISCONTINUED | OUTPATIENT
Start: 2024-03-28 | End: 2024-03-28

## 2024-03-28 RX ORDER — DEXTROSE 50 % IN WATER 50 %
15 SYRINGE (ML) INTRAVENOUS ONCE
Refills: 0 | Status: DISCONTINUED | OUTPATIENT
Start: 2024-03-28 | End: 2024-03-31

## 2024-03-28 RX ORDER — INSULIN LISPRO 100/ML
VIAL (ML) SUBCUTANEOUS AT BEDTIME
Refills: 0 | Status: DISCONTINUED | OUTPATIENT
Start: 2024-03-28 | End: 2024-03-31

## 2024-03-28 RX ORDER — DEXTROSE 50 % IN WATER 50 %
12.5 SYRINGE (ML) INTRAVENOUS ONCE
Refills: 0 | Status: DISCONTINUED | OUTPATIENT
Start: 2024-03-28 | End: 2024-03-31

## 2024-03-28 RX ADMIN — Medication 100 MILLIGRAM(S): at 09:00

## 2024-03-28 RX ADMIN — Medication 100 MILLIGRAM(S): at 00:39

## 2024-03-28 RX ADMIN — SODIUM CHLORIDE 1000 MILLILITER(S): 9 INJECTION INTRAMUSCULAR; INTRAVENOUS; SUBCUTANEOUS at 00:39

## 2024-03-28 RX ADMIN — Medication 30 MILLIGRAM(S): at 07:59

## 2024-03-28 RX ADMIN — Medication 1: at 07:35

## 2024-03-28 RX ADMIN — Medication 15 MILLIGRAM(S): at 00:39

## 2024-03-28 RX ADMIN — Medication 1: at 16:59

## 2024-03-28 RX ADMIN — Medication 30 MILLIGRAM(S): at 08:59

## 2024-03-28 RX ADMIN — Medication 2: at 11:39

## 2024-03-28 RX ADMIN — Medication 100 MILLIGRAM(S): at 16:56

## 2024-03-28 NOTE — ED PROVIDER NOTE - PHYSICAL EXAMINATION
CONSTITUTIONAL: Comfortable; in no acute distress. Non-toxic appearing.   NEURO: Alert & oriented. Sensory and motor functions are grossly intact.  PSYCH: Mood appropriate. Thought processes intact.   HEAD: NCAT  CARD: Regular rate and rhythm, no murmurs  RESP: No accessory muscle use; breath sounds clear and equal bilaterally; no wheezes, rhonchi, or rales     ABD: Soft; non-distended; non-tender. No guarding or rebound.   MUSCULOSKELETAL/EXTREMITIES: (+) right hand fifth digit swelling, redness, and tenderness over the mid and distal phalanxes, associated fullness of the magdaleno side of the distal phalanx. Limited ROM 2/2 pain and swelling. Neurovascularly intact. No localized area of purulence. Rest of extremities: WNL.  SKIN: Warm; dry; no apparent lesions or exudate

## 2024-03-28 NOTE — ED PROVIDER NOTE - CLINICAL SUMMARY MEDICAL DECISION MAKING FREE TEXT BOX
43 y/o F with PMH of DM presents with right hand fifth finger infection x 2 days. Patient appears to have worsened infection of the distal fifth phalanx. Will consult ortho who is covering for hand to r/o gladys. Patient will likely need to be admitted or transferred to cdu for iv abx due to failure of outpatient therapy. Blood work and xray finger ordered. Toradol, fluids, and clinda ordered for treatment. Will reassess. 43 y/o F with PMH of DM presents with right hand fifth finger infection x 2 days. Patient appears to have worsened infection of the distal fifth phalanx. Will consult ortho who is covering for hand to r/o felon. Patient will likely need to be admitted or transferred to cdu for iv abx due to failure of outpatient therapy. Blood work and xray finger ordered. Toradol, fluids, and clinda ordered for treatment. Will reassess.  Labs wnl. Imaging unremarkable for acute findings.  Ortho evaluated the patient and requested CT with contrast for better evaluation of possible abscess.  CT results show right fifth digit soft tissue swelling. No rim-enhancing collection/abscess or subcutaneous emphysema.  No acute interventions required by ortho at this time. Plan will be to transfer the patient to CDU for continued abx therapy and monitoring with possible ID consult in the am.

## 2024-03-28 NOTE — ED ADULT NURSE NOTE - OBJECTIVE STATEMENT
received pt to intake. A&OX4 RR even unlabored completing full clear sentences. pt primarily Estonian speaking pt request daughter Hermann for translation. pt presents endorsing finger pain for 2 wks. per daughter pt states "I have pinky pain for about 13 days, I was seen here 2 days ago with pain and some redness by the nail bed and now it has become very swollen and painful, I was on antibiotics which finished Monday." pt also states "can not bend finger and feels like throbbing pain traveling up arm." + swelling warmth to mid finger rt 5th finger, no obvious warm redness noted tracking up arm. past medical hx DM2. denies h/a, dizziness/lightheadedness, abd pain, n/v/d, fevers/chills, gu sx, falls/trauma, cp, sob. 20gIV placed LAC, labs collected and sent as ordered. safety measures maintained

## 2024-03-28 NOTE — ED CDU PROVIDER INITIAL DAY NOTE - CLINICAL SUMMARY MEDICAL DECISION MAKING FREE TEXT BOX
43 y/o F with PMH of DM presents with right hand fifth finger infection x 2 days. Patient appears to have worsened infection of the distal fifth phalanx. ortho consulted.  Blood work and xray finger ordered. Toradol, fluids, and clinda ordered for treatment.   Labs wnl. Imaging unremarkable for acute findings.  Ortho evaluated the patient and requested CT with contrast for better evaluation of possible abscess.  CT results show right fifth digit soft tissue swelling. No rim-enhancing collection/abscess or subcutaneous emphysema.  No acute interventions required by ortho at this time. Plan will be to transfer the patient to CDU for continued abx therapy and monitoring with possible ID consult in the am.

## 2024-03-28 NOTE — ED PROVIDER NOTE - OBJECTIVE STATEMENT
43 y/o F with PMH of DM presents with right hand fifth finger infection x 2 days. Patient notes that 2 weeks ago she developed a localized infection to the nailbed of her right hand fifth finger. She went to see her pcp regarding the infection at that time and she was placed on keflex for 1 week. She completed the abx by this past monday and the infection seemed to have been improving at that time but she presented to the ED at that time due to some skin discoloration in the area that she was hoping was not connected to her diabetes. She was reassured at that time that everything seemed ok, but since her last hospital visit she has developed worsened pain, swelling, and redness of the same finger prompting her current visit. Denies any other complaints or concerns. Denies chest pain, SOB, cough, fevers, chills, abdominal pain, N/V/D/C, urinary complaints, HA, dizziness, numbness, tingling, weakness, trauma, injuries, falls, sick contacts, recent travel.

## 2024-03-28 NOTE — ED CDU PROVIDER INITIAL DAY NOTE - ATTENDING APP SHARED VISIT CONTRIBUTION OF CARE
The decision was made  to admit this patient to the CDU as documented in the Provider note I have reviewed the note  written by the CDU Physician Assistant, on that visit day. I have supervised and participated as necessary in the performance of procedures indicated for patient management and was available at all phases of the patient´s visit when needed.    Please see the  provider note for the details of the decision to admit  Vital Signs Last 24 Hrs  T(C): 36.7 (28 Mar 2024 05:55), Max: 36.9 (28 Mar 2024 05:35)  T(F): 98 (28 Mar 2024 05:55), Max: 98.4 (28 Mar 2024 05:35)  HR: 76 (28 Mar 2024 05:55) (71 - 85)  BP: 135/86 (28 Mar 2024 05:55) (122/84 - 160/94)  BP(mean): --  RR: 16 (28 Mar 2024 05:55) (15 - 18)  SpO2: 98% (28 Mar 2024 05:55) (98% - 100%)    PE unchanged at time of admission  Pt with finger infection suspected felon receiving IV antibiotics with negative CT   Plan continue antibiotics for today if improved d/c with same and outpt followup

## 2024-03-28 NOTE — ED ADULT NURSE NOTE - CHIEF COMPLAINT QUOTE
Pt Spanish speaking pt request daughter Hermann  for translation as per daughter .  pt st" I have pinky pain for 13 days was seen here 2 days ago with pain with some redness by the nail bed and  now very swollen and painful  was on antibiotics finished Monday. Finger so swollen  can not bend finger and she  feels throbbing pain traveling up arm ." + swelling warmth to mid finger rt 5th finger, no obvious warm redness noted tracking up arm.. HX DM2

## 2024-03-28 NOTE — ED CDU PROVIDER INITIAL DAY NOTE - OBJECTIVE STATEMENT
45 y/o F with PMH of DM presents with right hand fifth finger infection x 2 days. Patient appears to have worsened infection of the distal fifth phalanx. ortho consulted.  Blood work and xray finger ordered. Toradol, fluids, and clinda ordered for treatment.   Labs wnl. Imaging unremarkable for acute findings.  Ortho evaluated the patient and requested CT with contrast for better evaluation of possible abscess.  CT results show right fifth digit soft tissue swelling. No rim-enhancing collection/abscess or subcutaneous emphysema.  No acute interventions required by ortho at this time. Plan will be to transfer the patient to CDU for continued abx therapy and monitoring with possible ID consult in the am.

## 2024-03-28 NOTE — ED CDU PROVIDER DISPOSITION NOTE - NSFOLLOWUPINSTRUCTIONS_ED_ALL_ED_FT
Follow up with your primary doctor for a post-hospital visit. Follow up with the hand/orthopedics specialist within 1 week, our discharge center will call you to help make an appointment. Take Clindamycin 1 capsule every 6 hours (4x a day) with food for one week to treat the infection. Finish the full course of antibiotics. Keep the hand elevated to help with swelling. Monitor your blood sugar at home- high blood sugar will prevent your infection from getting better.     Return to the ER if you develop worsening pain/swelling, are unable to bend your finger, have fever, or any other concerning symptoms.

## 2024-03-28 NOTE — ED PROVIDER NOTE - ATTENDING APP SHARED VISIT CONTRIBUTION OF CARE
Dr. Boswell:  I performed a face to face bedside interview with patient regarding history of present illness, review of symptoms and past medical history. I completed an independent physical exam.  I have discussed patient's plan of care with PA.   I agree with note as stated above, having amended the EMR as needed to reflect my findings.   This includes HISTORY OF PRESENT ILLNESS, HIV, PAST MEDICAL/SURGICAL/FAMILY/SOCIAL HISTORY, ALLERGIES AND HOME MEDICATIONS, REVIEW OF SYSTEMS, PHYSICAL EXAM, and any PROGRESS NOTES during the time I functioned as the attending physician for this patient.    44F h/o DM presents with right 5th finger infection x 2 weeks.  Was given Keflex by outside provider, initially with improvement but worsening swelling again the last two days.       Exam:  - nad  - +swelling to right finger distal segment    A/P  - finger infection, likely extension from paronychia, possible felon component   - abx, hand consult

## 2024-03-28 NOTE — ED ADULT NURSE REASSESSMENT NOTE - NS ED NURSE REASSESS COMMENT FT1
report given CDU RN [facility], pt transported to CDU bed 6 with all pt belongings.   safety measures maintained throughout
Clothing

## 2024-03-28 NOTE — CONSULT NOTE ADULT - SUBJECTIVE AND OBJECTIVE BOX
HPI  44yFemale R-hand dominant c/o R 5th finger pain and swelling for 10 days. Patient was seen in urgent care on 3/18 for paronychia and treated with keflex x7 days. No I&D performed at . Patient completed keflex on Monday. She came to the ED on Monday with concerns for skin color changes over anterior right 5th finger and was discharged home. She returns today with increased swelling and pain of finger.  Denies fevers/chills. Denies any trauma/injury or numbness/tingling in R hand.    ROS  Negative unless otherwise specified in HPI.    PAST MEDICAL & SURGICAL Hx  PAST MEDICAL & SURGICAL HISTORY:  DM2 (diabetes mellitus, type 2)      Anemia      No significant past surgical history          MEDICATIONS  Home Medications:  metFORMIN 1000 mg oral tablet: 1 tab(s) orally 2 times a day    crush tablet  (01 Dec 2018 11:55)      ALLERGIES  No Known Allergies      FAMILY Hx  FAMILY HISTORY:  Family history of diabetes mellitus (Father)    Family history of hypertension (Father)        SOCIAL Hx  Social History:      VITALS  Vital Signs Last 24 Hrs  T(C): 36.7 (27 Mar 2024 23:21), Max: 36.7 (27 Mar 2024 23:21)  T(F): 98.1 (27 Mar 2024 23:21), Max: 98.1 (27 Mar 2024 23:21)  HR: 103 (27 Mar 2024 23:21) (103 - 103)  BP: 126/86 (27 Mar 2024 23:21) (126/86 - 126/86)  BP(mean): --  RR: 18 (27 Mar 2024 23:21) (18 - 18)  SpO2: 100% (27 Mar 2024 23:21) (100% - 100%)    Parameters below as of 27 Mar 2024 23:21  Patient On (Oxygen Delivery Method): room air        PHYSICAL EXAM  Gen: Sitting in bed, NAD  Resp: No increased WOB  R Hand:  5th finger with mild area erythema/swelling extending distally from PIP  No fluctuance  Generalized TTP over finger from PIP extending distally otherwise no TTP throughout remainder of hand, compartments soft  Motor: 5th finger flexion/extension at DIPJ/PIPJ/MCPJ intact (pain limited)  Sensory: SILT   +Rad pulse, 5th finger WWP w/ cap refill <2 sec    LABS                        7.7    5.40  )-----------( 420      ( 28 Mar 2024 00:21 )             26.7     03-28    140  |  105  |  10  ----------------------------<  151<H>  3.7   |  22  |  0.60    Ca    8.9      28 Mar 2024 00:21    TPro  7.4  /  Alb  4.0  /  TBili  <0.2  /  DBili  x   /  AST  19  /  ALT  16  /  AlkPhos  74  03-28        IMAGING  XRs: R 5th finger with mild soft tissue swelling, no acute fx or dislocation    Bedside US  Performed by ED attending. Generalized soft tissue swelling without defined fluid collection.     ASSESSMENT & PLAN  44yFemale w/ R 5th finger pain/erythema/swelling in setting of recent paronychia s/p PO abx suspicious for cellulitis.  -CT R Hand to r/o felon  -IV abx per ED  -pain control  -ice/cold compress, elevation    For all questions related to patient care, please reach out via the on-call pager.*     Venessa Bashir PGY1  Orthopedic Surgery  [facility]: [phone]  [facility]: [phone]  [facility] [place]: [phone]  HPI  44yFemale R-hand dominant c/o R 5th finger pain and swelling for 10 days. Patient was seen in urgent care on 3/18 for paronychia and treated with keflex x7 days. No I&D performed at . Patient completed keflex on Monday. She came to the ED on Monday with concerns for skin color changes over anterior right 5th finger and was discharged home. She returns today with increased swelling and pain of finger.  Denies fevers/chills. Denies any trauma/injury or numbness/tingling in R hand.    ROS  Negative unless otherwise specified in HPI.    PAST MEDICAL & SURGICAL Hx  PAST MEDICAL & SURGICAL HISTORY:  DM2 (diabetes mellitus, type 2)      Anemia      No significant past surgical history          MEDICATIONS  Home Medications:  metFORMIN 1000 mg oral tablet: 1 tab(s) orally 2 times a day    crush tablet  (01 Dec 2018 11:55)      ALLERGIES  No Known Allergies      FAMILY Hx  FAMILY HISTORY:  Family history of diabetes mellitus (Father)    Family history of hypertension (Father)        SOCIAL Hx  Social History:      VITALS  Vital Signs Last 24 Hrs  T(C): 36.7 (27 Mar 2024 23:21), Max: 36.7 (27 Mar 2024 23:21)  T(F): 98.1 (27 Mar 2024 23:21), Max: 98.1 (27 Mar 2024 23:21)  HR: 103 (27 Mar 2024 23:21) (103 - 103)  BP: 126/86 (27 Mar 2024 23:21) (126/86 - 126/86)  BP(mean): --  RR: 18 (27 Mar 2024 23:21) (18 - 18)  SpO2: 100% (27 Mar 2024 23:21) (100% - 100%)    Parameters below as of 27 Mar 2024 23:21  Patient On (Oxygen Delivery Method): room air        PHYSICAL EXAM  Gen: Sitting in bed, NAD  Resp: No increased WOB  R Hand:  5th finger with mild area erythema/swelling extending distally from PIP  No fluctuance  Generalized TTP over finger from PIP extending distally otherwise no TTP throughout remainder of hand, compartments soft  Motor: 5th finger flexion/extension at DIPJ/PIPJ/MCPJ intact (pain limited)  Sensory: SILT   +Rad pulse, 5th finger WWP w/ cap refill <2 sec    LABS                        7.7    5.40  )-----------( 420      ( 28 Mar 2024 00:21 )             26.7     03-28    140  |  105  |  10  ----------------------------<  151<H>  3.7   |  22  |  0.60    Ca    8.9      28 Mar 2024 00:21    TPro  7.4  /  Alb  4.0  /  TBili  <0.2  /  DBili  x   /  AST  19  /  ALT  16  /  AlkPhos  74  03-28        IMAGING  XRs: R 5th finger with mild soft tissue swelling, no acute fx or dislocation    Bedside US  Performed by ED attending. Generalized soft tissue swelling without defined fluid collection.     ASSESSMENT & PLAN  44yFemale w/ R 5th finger pain/erythema/swelling in setting of recent paronychia s/p PO abx suspicious for cellulitis.  -CT R Hand to r/o felon  -IV abx per ED  -pain control  -ice/cold compress, elevation    For all questions related to patient care, please reach out via the on-call pager.*     Venessa Bashir PGY1  Orthopedic Surgery  [facility]: [phone]  [facility]: [phone]  [facility] [phone]     UPDATE 0816 AM  CT demonstrates soft tissue swelling without discernable fluid collection. No orthopedic intervention indicated. Recommend IV abx and medical management. Please reconsult if patient condition changes/worsens.

## 2024-03-28 NOTE — ED CDU PROVIDER DISPOSITION NOTE - PATIENT PORTAL LINK FT
You can access the FollowMyHealth Patient Portal offered by [facility] by registering at the following website: [email or web address]. By joining [employer]’s FollowMyHealth portal, you will also be able to view your health information using other applications (apps) compatible with our system.

## 2024-03-28 NOTE — ED CDU PROVIDER DISPOSITION NOTE - CLINICAL COURSE
45 y/o F with PMH of DM presents with right hand fifth finger infection x 2 days. Patient appears to have worsened infection of the distal fifth phalanx. ortho consulted.  Blood work and xray finger ordered. Toradol, fluids, and clinda ordered for treatment. Ortho evaluated the patient and requested CT with contrast for better evaluation of possible abscess. CT results show right fifth digit soft tissue swelling. No rim-enhancing collection/abscess or subcutaneous emphysema. No acute interventions required by ortho at this time. Pt on Clindamycin in cdu. Today, erythema and edema to digit are improving. Pt states she is able to flex her finger better then before. Plan to continue Clindamycin for 1 week at home w/ ortho/hand follow up. Strict return precautions given. Discussed glycemic control to optimize wound healing. 45 y/o F with PMH of DM presents with right hand fifth finger infection x 2 days. Patient appears to have worsened infection of the distal fifth phalanx. ortho consulted.  Blood work and xray finger ordered. Toradol, fluids, and clinda ordered for treatment. Ortho evaluated the patient and requested CT with contrast for better evaluation of possible abscess. CT results show right fifth digit soft tissue swelling. No rim-enhancing collection/abscess or subcutaneous emphysema. No acute interventions required by ortho at this time. Pt on Clindamycin in cdu. Today, erythema and edema to digit are improving. Pt states she is able to flex her finger better then before. Plan to continue Clindamycin for 1 week at home w/ ortho/hand follow up. Strict return precautions given. Discussed glycemic control to optimize wound healing. Also discussed Hgb 7.7,pt states she gets iron infusions regularly for heavy vaginal bleeding- will follow up.     Alma Delia  ID [id number] used for interpretation

## 2024-04-03 ENCOUNTER — APPOINTMENT (OUTPATIENT)
Dept: ORTHOPEDIC SURGERY | Facility: CLINIC | Age: 44
End: 2024-04-03
Payer: MEDICAID

## 2024-04-03 PROCEDURE — 99204 OFFICE O/P NEW MOD 45 MIN: CPT

## 2024-04-10 ENCOUNTER — APPOINTMENT (OUTPATIENT)
Dept: ORTHOPEDIC SURGERY | Facility: CLINIC | Age: 44
End: 2024-04-10
Payer: MEDICAID

## 2024-04-10 DIAGNOSIS — L08.9 LOCAL INFECTION OF THE SKIN AND SUBCUTANEOUS TISSUE, UNSPECIFIED: ICD-10-CM

## 2024-04-10 PROCEDURE — 99212 OFFICE O/P EST SF 10 MIN: CPT | Mod: 25

## 2025-08-28 ENCOUNTER — EMERGENCY (EMERGENCY)
Facility: HOSPITAL | Age: 45
LOS: 1 days | End: 2025-08-28
Attending: STUDENT IN AN ORGANIZED HEALTH CARE EDUCATION/TRAINING PROGRAM | Admitting: EMERGENCY MEDICINE
Payer: MEDICAID

## 2025-08-28 VITALS
OXYGEN SATURATION: 100 % | TEMPERATURE: 98 F | SYSTOLIC BLOOD PRESSURE: 130 MMHG | HEIGHT: 62 IN | HEART RATE: 99 BPM | DIASTOLIC BLOOD PRESSURE: 84 MMHG | WEIGHT: 132.94 LBS | RESPIRATION RATE: 16 BRPM

## 2025-08-28 LAB
APPEARANCE UR: CLEAR — SIGNIFICANT CHANGE UP
BASOPHILS # BLD AUTO: 0.04 K/UL — SIGNIFICANT CHANGE UP (ref 0–0.2)
BASOPHILS NFR BLD AUTO: 0.8 % — SIGNIFICANT CHANGE UP (ref 0–2)
BILIRUB UR-MCNC: NEGATIVE — SIGNIFICANT CHANGE UP
COLOR SPEC: YELLOW — SIGNIFICANT CHANGE UP
DIFF PNL FLD: NEGATIVE — SIGNIFICANT CHANGE UP
EOSINOPHIL # BLD AUTO: 0.14 K/UL — SIGNIFICANT CHANGE UP (ref 0–0.5)
EOSINOPHIL NFR BLD AUTO: 2.8 % — SIGNIFICANT CHANGE UP (ref 0–6)
GLUCOSE UR QL: 500 MG/DL
HCT VFR BLD CALC: 26.7 % — LOW (ref 34.5–45)
HGB BLD-MCNC: 8 G/DL — LOW (ref 11.5–15.5)
IMM GRANULOCYTES # BLD AUTO: 0 K/UL — SIGNIFICANT CHANGE UP (ref 0–0.07)
IMM GRANULOCYTES NFR BLD AUTO: 0 % — SIGNIFICANT CHANGE UP (ref 0–0.9)
KETONES UR QL: NEGATIVE MG/DL — SIGNIFICANT CHANGE UP
LEUKOCYTE ESTERASE UR-ACNC: NEGATIVE — SIGNIFICANT CHANGE UP
LYMPHOCYTES # BLD AUTO: 1.81 K/UL — SIGNIFICANT CHANGE UP (ref 1–3.3)
LYMPHOCYTES NFR BLD AUTO: 36.4 % — SIGNIFICANT CHANGE UP (ref 13–44)
MCHC RBC-ENTMCNC: 24 PG — LOW (ref 27–34)
MCHC RBC-ENTMCNC: 30 G/DL — LOW (ref 32–36)
MCV RBC AUTO: 79.9 FL — LOW (ref 80–100)
MONOCYTES # BLD AUTO: 0.43 K/UL — SIGNIFICANT CHANGE UP (ref 0–0.9)
MONOCYTES NFR BLD AUTO: 8.7 % — SIGNIFICANT CHANGE UP (ref 2–14)
NEUTROPHILS # BLD AUTO: 2.55 K/UL — SIGNIFICANT CHANGE UP (ref 1.8–7.4)
NEUTROPHILS NFR BLD AUTO: 51.3 % — SIGNIFICANT CHANGE UP (ref 43–77)
NITRITE UR-MCNC: NEGATIVE — SIGNIFICANT CHANGE UP
NRBC # BLD AUTO: 0 K/UL — SIGNIFICANT CHANGE UP (ref 0–0)
NRBC # FLD: 0 K/UL — SIGNIFICANT CHANGE UP (ref 0–0)
NRBC BLD AUTO-RTO: 0 /100 WBCS — SIGNIFICANT CHANGE UP (ref 0–0)
PH UR: 7 — SIGNIFICANT CHANGE UP (ref 5–8)
PLATELET # BLD AUTO: 315 K/UL — SIGNIFICANT CHANGE UP (ref 150–400)
PMV BLD: 11.4 FL — SIGNIFICANT CHANGE UP (ref 7–13)
PROT UR-MCNC: NEGATIVE MG/DL — SIGNIFICANT CHANGE UP
RBC # BLD: 3.34 M/UL — LOW (ref 3.8–5.2)
RBC # FLD: 15.7 % — HIGH (ref 10.3–14.5)
SP GR SPEC: 1.01 — SIGNIFICANT CHANGE UP (ref 1–1.03)
UROBILINOGEN FLD QL: 0.2 MG/DL — SIGNIFICANT CHANGE UP (ref 0.2–1)
WBC # BLD: 4.97 K/UL — SIGNIFICANT CHANGE UP (ref 3.8–10.5)
WBC # FLD AUTO: 4.97 K/UL — SIGNIFICANT CHANGE UP (ref 3.8–10.5)

## 2025-08-28 PROCEDURE — 93010 ELECTROCARDIOGRAM REPORT: CPT

## 2025-08-28 PROCEDURE — 99284 EMERGENCY DEPT VISIT MOD MDM: CPT

## 2025-08-29 VITALS
HEART RATE: 95 BPM | TEMPERATURE: 98 F | OXYGEN SATURATION: 100 % | RESPIRATION RATE: 16 BRPM | SYSTOLIC BLOOD PRESSURE: 121 MMHG | DIASTOLIC BLOOD PRESSURE: 72 MMHG

## 2025-08-29 LAB
ADD ON TEST-SPECIMEN IN LAB: SIGNIFICANT CHANGE UP
ALBUMIN SERPL ELPH-MCNC: 3.9 G/DL — SIGNIFICANT CHANGE UP (ref 3.3–5)
ALP SERPL-CCNC: 62 U/L — SIGNIFICANT CHANGE UP (ref 40–120)
ALT FLD-CCNC: 33 U/L — SIGNIFICANT CHANGE UP (ref 4–33)
ANION GAP SERPL CALC-SCNC: 15 MMOL/L — HIGH (ref 7–14)
AST SERPL-CCNC: 24 U/L — SIGNIFICANT CHANGE UP (ref 4–32)
BILIRUB SERPL-MCNC: 0.2 MG/DL — SIGNIFICANT CHANGE UP (ref 0.2–1.2)
BLD GP AB SCN SERPL QL: NEGATIVE — SIGNIFICANT CHANGE UP
BLD GP AB SCN SERPL QL: NEGATIVE — SIGNIFICANT CHANGE UP
BUN SERPL-MCNC: 5 MG/DL — LOW (ref 7–23)
CALCIUM SERPL-MCNC: 8.9 MG/DL — SIGNIFICANT CHANGE UP (ref 8.4–10.5)
CHLORIDE SERPL-SCNC: 102 MMOL/L — SIGNIFICANT CHANGE UP (ref 98–107)
CO2 SERPL-SCNC: 18 MMOL/L — LOW (ref 22–31)
CREAT SERPL-MCNC: 0.54 MG/DL — SIGNIFICANT CHANGE UP (ref 0.5–1.3)
EGFR: 115 ML/MIN/1.73M2 — SIGNIFICANT CHANGE UP
EGFR: 115 ML/MIN/1.73M2 — SIGNIFICANT CHANGE UP
GLUCOSE SERPL-MCNC: 203 MG/DL — HIGH (ref 70–99)
HCG SERPL-ACNC: <1 MIU/ML — SIGNIFICANT CHANGE UP
HCT VFR BLD CALC: 31.2 % — LOW (ref 34.5–45)
HGB BLD-MCNC: 9.6 G/DL — LOW (ref 11.5–15.5)
MCHC RBC-ENTMCNC: 24.7 PG — LOW (ref 27–34)
MCHC RBC-ENTMCNC: 30.8 G/DL — LOW (ref 32–36)
MCV RBC AUTO: 80.4 FL — SIGNIFICANT CHANGE UP (ref 80–100)
NRBC # BLD AUTO: 0 K/UL — SIGNIFICANT CHANGE UP (ref 0–0)
NRBC # FLD: 0 K/UL — SIGNIFICANT CHANGE UP (ref 0–0)
NRBC BLD AUTO-RTO: 0 /100 WBCS — SIGNIFICANT CHANGE UP (ref 0–0)
PLATELET # BLD AUTO: 282 K/UL — SIGNIFICANT CHANGE UP (ref 150–400)
PMV BLD: 11.3 FL — SIGNIFICANT CHANGE UP (ref 7–13)
POTASSIUM SERPL-MCNC: 3.8 MMOL/L — SIGNIFICANT CHANGE UP (ref 3.5–5.3)
POTASSIUM SERPL-SCNC: 3.8 MMOL/L — SIGNIFICANT CHANGE UP (ref 3.5–5.3)
PROT SERPL-MCNC: 7.6 G/DL — SIGNIFICANT CHANGE UP (ref 6–8.3)
RBC # BLD: 3.88 M/UL — SIGNIFICANT CHANGE UP (ref 3.8–5.2)
RBC # FLD: 15.1 % — HIGH (ref 10.3–14.5)
RH IG SCN BLD-IMP: POSITIVE — SIGNIFICANT CHANGE UP
RH IG SCN BLD-IMP: POSITIVE — SIGNIFICANT CHANGE UP
SODIUM SERPL-SCNC: 135 MMOL/L — SIGNIFICANT CHANGE UP (ref 135–145)
WBC # BLD: 4.7 K/UL — SIGNIFICANT CHANGE UP (ref 3.8–10.5)
WBC # FLD AUTO: 4.7 K/UL — SIGNIFICANT CHANGE UP (ref 3.8–10.5)

## 2025-08-29 PROCEDURE — 99223 1ST HOSP IP/OBS HIGH 75: CPT

## 2025-08-29 PROCEDURE — 76830 TRANSVAGINAL US NON-OB: CPT | Mod: 26

## 2025-08-29 RX ORDER — SODIUM CHLORIDE 9 G/1000ML
1000 INJECTION, SOLUTION INTRAVENOUS
Refills: 0 | Status: DISCONTINUED | OUTPATIENT
Start: 2025-08-29 | End: 2025-09-01

## 2025-08-29 RX ORDER — DEXTROSE 50 % IN WATER 50 %
15 SYRINGE (ML) INTRAVENOUS ONCE
Refills: 0 | Status: DISCONTINUED | OUTPATIENT
Start: 2025-08-29 | End: 2025-09-01

## 2025-08-29 RX ORDER — DEXTROSE 50 % IN WATER 50 %
12.5 SYRINGE (ML) INTRAVENOUS ONCE
Refills: 0 | Status: DISCONTINUED | OUTPATIENT
Start: 2025-08-29 | End: 2025-09-01

## 2025-08-29 RX ORDER — DEXTROSE 50 % IN WATER 50 %
25 SYRINGE (ML) INTRAVENOUS ONCE
Refills: 0 | Status: DISCONTINUED | OUTPATIENT
Start: 2025-08-29 | End: 2025-09-01

## 2025-08-29 RX ORDER — GLUCAGON 3 MG/1
1 POWDER NASAL ONCE
Refills: 0 | Status: DISCONTINUED | OUTPATIENT
Start: 2025-08-29 | End: 2025-09-01

## 2025-08-29 RX ORDER — INSULIN LISPRO 100 U/ML
INJECTION, SOLUTION INTRAVENOUS; SUBCUTANEOUS
Refills: 0 | Status: DISCONTINUED | OUTPATIENT
Start: 2025-08-29 | End: 2025-09-01

## 2025-08-29 RX ORDER — INSULIN LISPRO 100 U/ML
INJECTION, SOLUTION INTRAVENOUS; SUBCUTANEOUS AT BEDTIME
Refills: 0 | Status: DISCONTINUED | OUTPATIENT
Start: 2025-08-29 | End: 2025-09-01

## 2025-08-29 RX ADMIN — INSULIN LISPRO 1: 100 INJECTION, SOLUTION INTRAVENOUS; SUBCUTANEOUS at 08:01

## 2025-09-16 ENCOUNTER — APPOINTMENT (OUTPATIENT)
Dept: OBGYN | Facility: CLINIC | Age: 45
End: 2025-09-16
Payer: MEDICAID

## 2025-09-16 ENCOUNTER — NON-APPOINTMENT (OUTPATIENT)
Age: 45
End: 2025-09-16

## 2025-09-16 VITALS
DIASTOLIC BLOOD PRESSURE: 90 MMHG | SYSTOLIC BLOOD PRESSURE: 121 MMHG | WEIGHT: 131 LBS | HEART RATE: 87 BPM | HEIGHT: 62 IN | BODY MASS INDEX: 24.11 KG/M2

## 2025-09-16 DIAGNOSIS — D25.9 LEIOMYOMA OF UTERUS, UNSPECIFIED: ICD-10-CM

## 2025-09-16 DIAGNOSIS — N93.9 ABNORMAL UTERINE AND VAGINAL BLEEDING, UNSPECIFIED: ICD-10-CM

## 2025-09-16 PROCEDURE — 96127 BRIEF EMOTIONAL/BEHAV ASSMT: CPT

## 2025-09-16 PROCEDURE — 99402 PREV MED CNSL INDIV APPRX 30: CPT

## 2025-09-16 PROCEDURE — 99204 OFFICE O/P NEW MOD 45 MIN: CPT | Mod: 25

## 2025-09-16 RX ORDER — NORGESTIMATE AND ETHINYL ESTRADIOL 0.25-0.035
0.25-35 KIT ORAL
Qty: 3 | Refills: 3 | Status: ACTIVE | COMMUNITY
Start: 2025-09-16 | End: 1900-01-01

## 2025-09-16 RX ORDER — IBUPROFEN 600 MG/1
600 TABLET, FILM COATED ORAL EVERY 6 HOURS
Qty: 30 | Refills: 1 | Status: ACTIVE | COMMUNITY
Start: 2025-09-16 | End: 1900-01-01